# Patient Record
Sex: FEMALE | Race: WHITE | NOT HISPANIC OR LATINO | Employment: OTHER | URBAN - METROPOLITAN AREA
[De-identification: names, ages, dates, MRNs, and addresses within clinical notes are randomized per-mention and may not be internally consistent; named-entity substitution may affect disease eponyms.]

---

## 2017-01-11 ENCOUNTER — APPOINTMENT (OUTPATIENT)
Dept: WOMENS IMAGING | Facility: HOSPITAL | Age: 62
End: 2017-01-11

## 2017-01-11 PROCEDURE — 76641 ULTRASOUND BREAST COMPLETE: CPT | Performed by: RADIOLOGY

## 2017-01-11 PROCEDURE — G0279 TOMOSYNTHESIS, MAMMO: HCPCS | Performed by: RADIOLOGY

## 2017-01-11 PROCEDURE — 77065 DX MAMMO INCL CAD UNI: CPT | Performed by: RADIOLOGY

## 2017-01-11 PROCEDURE — 77061 BREAST TOMOSYNTHESIS UNI: CPT | Performed by: RADIOLOGY

## 2017-01-11 PROCEDURE — G0206 DX MAMMO INCL CAD UNI: HCPCS | Performed by: RADIOLOGY

## 2017-07-12 ENCOUNTER — APPOINTMENT (OUTPATIENT)
Dept: WOMENS IMAGING | Facility: HOSPITAL | Age: 62
End: 2017-07-12

## 2017-07-12 PROCEDURE — 76641 ULTRASOUND BREAST COMPLETE: CPT | Performed by: RADIOLOGY

## 2017-07-12 PROCEDURE — 77061 BREAST TOMOSYNTHESIS UNI: CPT | Performed by: RADIOLOGY

## 2017-07-12 PROCEDURE — 77065 DX MAMMO INCL CAD UNI: CPT | Performed by: RADIOLOGY

## 2017-07-12 PROCEDURE — G0279 TOMOSYNTHESIS, MAMMO: HCPCS | Performed by: RADIOLOGY

## 2017-07-12 PROCEDURE — G0206 DX MAMMO INCL CAD UNI: HCPCS | Performed by: RADIOLOGY

## 2017-07-14 ENCOUNTER — APPOINTMENT (OUTPATIENT)
Dept: WOMENS IMAGING | Facility: HOSPITAL | Age: 62
End: 2017-07-14

## 2017-07-14 PROCEDURE — 19083 BX BREAST 1ST LESION US IMAG: CPT | Performed by: RADIOLOGY

## 2017-09-04 DIAGNOSIS — E03.9 ACQUIRED HYPOTHYROIDISM: ICD-10-CM

## 2017-09-05 RX ORDER — LEVOTHYROXINE SODIUM 0.05 MG/1
TABLET ORAL
Qty: 30 TABLET | Refills: 0 | Status: SHIPPED | OUTPATIENT
Start: 2017-09-05 | End: 2017-10-16 | Stop reason: SDUPTHER

## 2017-10-09 DIAGNOSIS — E78.5 HYPERLIPIDEMIA, UNSPECIFIED HYPERLIPIDEMIA TYPE: ICD-10-CM

## 2017-10-09 DIAGNOSIS — E03.9 HYPOTHYROIDISM, UNSPECIFIED TYPE: Primary | ICD-10-CM

## 2017-10-09 DIAGNOSIS — Z00.00 PREVENTATIVE HEALTH CARE: ICD-10-CM

## 2017-10-09 LAB
ALBUMIN SERPL-MCNC: 4.5 G/DL (ref 3.5–5.2)
ALBUMIN/GLOB SERPL: 1.9 G/DL
ALP SERPL-CCNC: 79 U/L (ref 39–117)
ALT SERPL-CCNC: 11 U/L (ref 1–33)
AST SERPL-CCNC: 12 U/L (ref 1–32)
BILIRUB SERPL-MCNC: 0.4 MG/DL (ref 0.1–1.2)
BUN SERPL-MCNC: 13 MG/DL (ref 8–23)
BUN/CREAT SERPL: 14.1 (ref 7–25)
CALCIUM SERPL-MCNC: 9.8 MG/DL (ref 8.6–10.5)
CHLORIDE SERPL-SCNC: 101 MMOL/L (ref 98–107)
CHOLEST SERPL-MCNC: 222 MG/DL (ref 0–200)
CO2 SERPL-SCNC: 27 MMOL/L (ref 22–29)
CREAT SERPL-MCNC: 0.92 MG/DL (ref 0.57–1)
GLOBULIN SER CALC-MCNC: 2.4 GM/DL
GLUCOSE SERPL-MCNC: 94 MG/DL (ref 65–99)
HDLC SERPL-MCNC: 53 MG/DL (ref 40–60)
LDLC SERPL CALC-MCNC: 140 MG/DL (ref 0–100)
LDLC/HDLC SERPL: 2.64 {RATIO}
POTASSIUM SERPL-SCNC: 4.4 MMOL/L (ref 3.5–5.2)
PROT SERPL-MCNC: 6.9 G/DL (ref 6–8.5)
SODIUM SERPL-SCNC: 141 MMOL/L (ref 136–145)
TRIGL SERPL-MCNC: 145 MG/DL (ref 0–150)
VLDLC SERPL CALC-MCNC: 29 MG/DL (ref 5–40)

## 2017-10-10 LAB — TSH SERPL DL<=0.005 MIU/L-ACNC: 0.28 MIU/ML (ref 0.27–4.2)

## 2017-10-16 ENCOUNTER — OFFICE VISIT (OUTPATIENT)
Dept: FAMILY MEDICINE CLINIC | Facility: CLINIC | Age: 62
End: 2017-10-16

## 2017-10-16 VITALS
HEART RATE: 73 BPM | BODY MASS INDEX: 24.59 KG/M2 | OXYGEN SATURATION: 94 % | SYSTOLIC BLOOD PRESSURE: 118 MMHG | DIASTOLIC BLOOD PRESSURE: 80 MMHG | WEIGHT: 144 LBS | HEIGHT: 64 IN

## 2017-10-16 DIAGNOSIS — Z00.00 HEALTHCARE MAINTENANCE: Primary | ICD-10-CM

## 2017-10-16 DIAGNOSIS — E03.9 ACQUIRED HYPOTHYROIDISM: ICD-10-CM

## 2017-10-16 DIAGNOSIS — Z23 NEED FOR VACCINATION: ICD-10-CM

## 2017-10-16 PROBLEM — C50.911: Status: ACTIVE | Noted: 2017-07-24

## 2017-10-16 PROBLEM — Z17.0: Status: ACTIVE | Noted: 2017-07-24

## 2017-10-16 PROCEDURE — 90715 TDAP VACCINE 7 YRS/> IM: CPT | Performed by: FAMILY MEDICINE

## 2017-10-16 PROCEDURE — 90471 IMMUNIZATION ADMIN: CPT | Performed by: FAMILY MEDICINE

## 2017-10-16 PROCEDURE — 90472 IMMUNIZATION ADMIN EACH ADD: CPT | Performed by: FAMILY MEDICINE

## 2017-10-16 PROCEDURE — 90686 IIV4 VACC NO PRSV 0.5 ML IM: CPT | Performed by: FAMILY MEDICINE

## 2017-10-16 PROCEDURE — 99396 PREV VISIT EST AGE 40-64: CPT | Performed by: FAMILY MEDICINE

## 2017-10-16 RX ORDER — LETROZOLE 2.5 MG/1
TABLET, FILM COATED ORAL
Refills: 3 | COMMUNITY
Start: 2017-09-22 | End: 2018-01-29

## 2017-10-16 RX ORDER — LEVOTHYROXINE SODIUM 0.05 MG/1
50 TABLET ORAL DAILY
Qty: 90 TABLET | Refills: 3 | Status: SHIPPED | OUTPATIENT
Start: 2017-10-16 | End: 2018-09-28 | Stop reason: SDUPTHER

## 2017-10-16 NOTE — PROGRESS NOTES
Ciara Alonzo is a 62 y.o. female.  Seen 10/16/2017    Assessment/Plan   Problem List Items Addressed This Visit     None      Visit Diagnoses     Need for vaccination    -  Primary    Relevant Orders    Flu Vaccine Quad PF 3YR+ (FLUARIX 1355-4001)             No Follow-up on file.  There are no Patient Instructions on file for this visit.    Subjective     Chief Complaint   Patient presents with   • Annual Exam     Social History   Substance Use Topics   • Smoking status: Never Smoker   • Smokeless tobacco: Never Used   • Alcohol use None       History of Present Illness     Annual Exam-Postmenopausal:     Ciara Alonzo 62 y.o.female presents for annual exam.     Last pap: approximate date 2016 and was normal  Last mammogramapproximate date 11/16 and was abnormal: She has undergone lumpectomy and is currently having radiation  Patient is on Femara for estrogen+ breast ca  .  The patient wears seatbelts: yes.    The patient has regular exercise: usually though not lately with her breast CA tx.    This patient has ever been tested for HepC: no .   Dental Exam. up to date  colonoscopy 10 years ago without abnormalities.  Her immunization are not up-to-date. Immunizations are updated today with a TdaP today, Flu Vaccine today.   She is eating a healthy diet.   Labs results were discussed    The following portions of the patient's history were reviewed and updated as appropriate:PMHroutine: Social history , Past Medical History, Surgical history , Allergies, Current Medications, Active Problem List, Family History and Health Maintenance    Review of Systems   Constitutional: Negative for activity change, appetite change, chills, fatigue, fever and unexpected weight change.   HENT: Negative for congestion, ear pain, hearing loss, nosebleeds, rhinorrhea and sore throat.    Eyes: Negative for pain, redness and visual disturbance.   Respiratory: Negative for cough, shortness of breath and wheezing.   "  Cardiovascular: Negative for chest pain, palpitations and leg swelling.   Gastrointestinal: Negative for abdominal pain, blood in stool, constipation, diarrhea, nausea and vomiting.   Endocrine: Negative for cold intolerance and heat intolerance.   Genitourinary: Negative for difficulty urinating, dysuria, frequency, hematuria, pelvic pain, urgency and vaginal discharge.   Musculoskeletal: Negative for arthralgias, back pain and joint swelling.   Skin: Negative for rash and wound.   Neurological: Negative for dizziness, weakness, numbness and headaches.   Hematological: Does not bruise/bleed easily.   Psychiatric/Behavioral: Negative for dysphoric mood, sleep disturbance and suicidal ideas. The patient is not nervous/anxious.        Objective   Vitals:    10/16/17 0941   BP: 118/80   Pulse: 73   SpO2: 94%   Weight: 144 lb (65.3 kg)   Height: 64\" (162.6 cm)     Body mass index is 24.72 kg/(m^2).  Physical Exam   Constitutional: She is oriented to person, place, and time. She appears well-developed and well-nourished. No distress.   HENT:   Head: Normocephalic and atraumatic.   Right Ear: Tympanic membrane, external ear and ear canal normal.   Left Ear: Tympanic membrane, external ear and ear canal normal.   Mouth/Throat: Oropharynx is clear and moist.   Eyes: Conjunctivae are normal.   Neck: Normal range of motion. Neck supple. No tracheal deviation present. No thyromegaly present.   Cardiovascular: Normal rate, regular rhythm, normal heart sounds and intact distal pulses.    Pulmonary/Chest: Effort normal and breath sounds normal. No respiratory distress. She has no wheezes. She has no rales.   Abdominal: Soft. Bowel sounds are normal. She exhibits no distension. There is no hepatosplenomegaly. There is no tenderness.   Musculoskeletal: She exhibits no edema or deformity.   Lymphadenopathy:     She has no cervical adenopathy.   Neurological: She is alert and oriented to person, place, and time.   Skin: Skin is " warm and dry.   Psychiatric: She has a normal mood and affect. Her behavior is normal. Judgment and thought content normal.   Vitals reviewed.    Reviewed Data:  Orders Only on 10/09/2017   Component Value Ref Range   • TSH 0.280  0.270 - 4.200 mIU/mL   • Total Cholesterol 222* 0 - 200 mg/dL   • Triglycerides 145  0 - 150 mg/dL   • HDL Cholesterol 53  40 - 60 mg/dL   • VLDL Cholesterol 29  5 - 40 mg/dL   • LDL Cholesterol  140* 0 - 100 mg/dL   • LDL/HDL Ratio 2.64     • Glucose 94  65 - 99 mg/dL   • BUN 13  8 - 23 mg/dL   • Creatinine 0.92  0.57 - 1.00 mg/dL   • eGFR Non African Am 62  >60 mL/min/1.73   • eGFR African Am 75  >60 mL/min/1.73   • BUN/Creatinine Ratio 14.1  7.0 - 25.0   • Sodium 141  136 - 145 mmol/L   • Potassium 4.4  3.5 - 5.2 mmol/L   • Chloride 101  98 - 107 mmol/L   • Total CO2 27.0  22.0 - 29.0 mmol/L   • Calcium 9.8  8.6 - 10.5 mg/dL   • Total Protein 6.9  6.0 - 8.5 g/dL   • Albumin 4.50  3.50 - 5.20 g/dL   • Globulin 2.4  gm/dL   • A/G Ratio 1.9  g/dL   • Total Bilirubin 0.4  0.1 - 1.2 mg/dL   • Alkaline Phosphatase 79  39 - 117 U/L   • AST (SGOT) 12  1 - 32 U/L   • ALT (SGPT) 11  1 - 33 U/L

## 2017-11-20 DIAGNOSIS — Z12.11 COLON CANCER SCREENING: Primary | ICD-10-CM

## 2017-11-20 RX ORDER — SODIUM CHLORIDE, SODIUM LACTATE, POTASSIUM CHLORIDE, CALCIUM CHLORIDE 600; 310; 30; 20 MG/100ML; MG/100ML; MG/100ML; MG/100ML
30 INJECTION, SOLUTION INTRAVENOUS CONTINUOUS
Status: CANCELLED | OUTPATIENT
Start: 2018-01-30

## 2017-12-11 DIAGNOSIS — E78.2 MIXED HYPERLIPIDEMIA: ICD-10-CM

## 2017-12-11 DIAGNOSIS — F41.8 DEPRESSION WITH ANXIETY: ICD-10-CM

## 2017-12-11 RX ORDER — CITALOPRAM 20 MG/1
20 TABLET ORAL DAILY
Qty: 90 TABLET | Refills: 1 | Status: SHIPPED | OUTPATIENT
Start: 2017-12-11 | End: 2018-09-28 | Stop reason: SDUPTHER

## 2017-12-11 RX ORDER — BUPROPION HYDROCHLORIDE 300 MG/1
300 TABLET ORAL DAILY
Qty: 90 TABLET | Refills: 1 | Status: SHIPPED | OUTPATIENT
Start: 2017-12-11 | End: 2018-06-02 | Stop reason: SDUPTHER

## 2017-12-11 RX ORDER — OMEGA-3-ACID ETHYL ESTERS 1 G/1
1 CAPSULE, LIQUID FILLED ORAL 2 TIMES DAILY
Qty: 180 CAPSULE | Refills: 1 | Status: SHIPPED | OUTPATIENT
Start: 2017-12-11 | End: 2019-10-11 | Stop reason: SDUPTHER

## 2018-01-02 PROBLEM — Z12.11 COLON CANCER SCREENING: Status: ACTIVE | Noted: 2018-01-02

## 2018-01-29 RX ORDER — LETROZOLE 2.5 MG/1
2.5 TABLET, FILM COATED ORAL DAILY
COMMUNITY

## 2018-01-30 ENCOUNTER — ANESTHESIA EVENT (OUTPATIENT)
Dept: GASTROENTEROLOGY | Facility: HOSPITAL | Age: 63
End: 2018-01-30

## 2018-01-30 ENCOUNTER — HOSPITAL ENCOUNTER (OUTPATIENT)
Facility: HOSPITAL | Age: 63
Setting detail: HOSPITAL OUTPATIENT SURGERY
Discharge: HOME | End: 2018-01-30
Attending: INTERNAL MEDICINE | Admitting: INTERNAL MEDICINE

## 2018-01-30 ENCOUNTER — ANESTHESIA (OUTPATIENT)
Dept: GASTROENTEROLOGY | Facility: HOSPITAL | Age: 63
End: 2018-01-30

## 2018-01-30 VITALS
RESPIRATION RATE: 14 BRPM | SYSTOLIC BLOOD PRESSURE: 118 MMHG | OXYGEN SATURATION: 100 % | HEART RATE: 70 BPM | HEIGHT: 63 IN | TEMPERATURE: 97.9 F | BODY MASS INDEX: 24.26 KG/M2 | DIASTOLIC BLOOD PRESSURE: 77 MMHG | WEIGHT: 136.9 LBS

## 2018-01-30 DIAGNOSIS — Z12.11 COLON CANCER SCREENING: ICD-10-CM

## 2018-01-30 PROCEDURE — 45380 COLONOSCOPY AND BIOPSY: CPT | Performed by: INTERNAL MEDICINE

## 2018-01-30 PROCEDURE — 25010000002 PROPOFOL 10 MG/ML EMULSION: Performed by: ANESTHESIOLOGY

## 2018-01-30 PROCEDURE — 88305 TISSUE EXAM BY PATHOLOGIST: CPT | Performed by: INTERNAL MEDICINE

## 2018-01-30 RX ORDER — PROPOFOL 10 MG/ML
VIAL (ML) INTRAVENOUS AS NEEDED
Status: DISCONTINUED | OUTPATIENT
Start: 2018-01-30 | End: 2018-01-30 | Stop reason: SURG

## 2018-01-30 RX ORDER — SODIUM CHLORIDE 0.9 % (FLUSH) 0.9 %
1-10 SYRINGE (ML) INJECTION AS NEEDED
Status: DISCONTINUED | OUTPATIENT
Start: 2018-01-30 | End: 2018-01-30 | Stop reason: HOSPADM

## 2018-01-30 RX ORDER — PROPOFOL 10 MG/ML
VIAL (ML) INTRAVENOUS CONTINUOUS PRN
Status: DISCONTINUED | OUTPATIENT
Start: 2018-01-30 | End: 2018-01-30 | Stop reason: SURG

## 2018-01-30 RX ORDER — SODIUM CHLORIDE, SODIUM LACTATE, POTASSIUM CHLORIDE, CALCIUM CHLORIDE 600; 310; 30; 20 MG/100ML; MG/100ML; MG/100ML; MG/100ML
30 INJECTION, SOLUTION INTRAVENOUS CONTINUOUS
Status: DISCONTINUED | OUTPATIENT
Start: 2018-01-30 | End: 2018-01-30 | Stop reason: HOSPADM

## 2018-01-30 RX ORDER — LIDOCAINE HYDROCHLORIDE 20 MG/ML
INJECTION, SOLUTION INFILTRATION; PERINEURAL AS NEEDED
Status: DISCONTINUED | OUTPATIENT
Start: 2018-01-30 | End: 2018-01-30 | Stop reason: SURG

## 2018-01-30 RX ADMIN — LIDOCAINE HYDROCHLORIDE 50 MG: 20 INJECTION, SOLUTION INFILTRATION; PERINEURAL at 11:19

## 2018-01-30 RX ADMIN — PROPOFOL 50 MG: 10 INJECTION, EMULSION INTRAVENOUS at 11:25

## 2018-01-30 RX ADMIN — PROPOFOL 75 MG: 10 INJECTION, EMULSION INTRAVENOUS at 11:22

## 2018-01-30 RX ADMIN — PROPOFOL 125 MCG/KG/MIN: 10 INJECTION, EMULSION INTRAVENOUS at 11:22

## 2018-01-30 RX ADMIN — SODIUM CHLORIDE, POTASSIUM CHLORIDE, SODIUM LACTATE AND CALCIUM CHLORIDE 30 ML/HR: 600; 310; 30; 20 INJECTION, SOLUTION INTRAVENOUS at 10:21

## 2018-01-30 NOTE — ANESTHESIA PREPROCEDURE EVALUATION
Anesthesia Evaluation     Patient summary reviewed     NPO Liquid Status: > 8 hours     Airway   no difficulty expected  Dental      Pulmonary    Cardiovascular     Rhythm: regular    (+) hyperlipidemia      Neuro/Psych  GI/Hepatic/Renal/Endo    (+)  hypothyroidism,     Musculoskeletal     Abdominal    Substance History      OB/GYN          Other                                                Anesthesia Plan    ASA 2     MAC     Anesthetic plan and risks discussed with patient.

## 2018-01-30 NOTE — ANESTHESIA POSTPROCEDURE EVALUATION
"Patient: Ciara Alonzo    Procedure Summary     Date Anesthesia Start Anesthesia Stop Room / Location    01/30/18 1117 1155  [facility] /  [facility]       Procedure Diagnosis Surgeon Provider    COLONOSCOPY TO CECUM AND INTO TERMINAL ILEUM WITH COLD BIOPSY POLYPECTOMY (N/A ) Colon cancer screening  (Colon cancer screening [Z12.11]) MD Erasmo Becker MD          Anesthesia Type: MAC  Last vitals  BP   118/77 (01/30/18 1215)   Temp   36.6 °C (97.9 °F) (01/30/18 1155)   Pulse   70 (01/30/18 1215)   Resp   14 (01/30/18 1215)     SpO2   100 % (01/30/18 1215)     Post Anesthesia Care and Evaluation    Patient location during evaluation: PACU  Patient participation: complete - patient participated  Level of consciousness: awake  Pain score: 0  Pain management: adequate  Airway patency: patent  Anesthetic complications: No anesthetic complications  PONV Status: none  Cardiovascular status: acceptable  Respiratory status: acceptable  Hydration status: acceptable    Comments: /77 (BP Location: Left arm, Patient Position: Sitting)  Pulse 70  Temp 36.6 °C (97.9 °F) (Oral)   Resp 14  Ht 160 cm (63\")  Wt 62.1 kg (136 lb 14.4 oz)  SpO2 100%  BMI 24.25 kg/m2      "

## 2018-01-31 LAB
CYTO UR: NORMAL
LAB AP CASE REPORT: NORMAL
Lab: NORMAL
PATH REPORT.FINAL DX SPEC: NORMAL
PATH REPORT.GROSS SPEC: NORMAL

## 2018-02-03 NOTE — PROGRESS NOTES
The polyp removed was a benign hyperplastic polyp.      Pls place in 3 year recall with 2 day bowel prep (due to bowel prep).

## 2018-02-07 ENCOUNTER — TELEPHONE (OUTPATIENT)
Dept: GASTROENTEROLOGY | Facility: CLINIC | Age: 63
End: 2018-02-07

## 2018-02-07 NOTE — TELEPHONE ENCOUNTER
----- Message from Mee Hou MD sent at 2/3/2018  3:43 PM EST -----  The polyp removed was a benign hyperplastic polyp.      Pls place in 3 year recall with 2 day bowel prep (due to bowel prep).

## 2018-02-07 NOTE — TELEPHONE ENCOUNTER
Called pt and advised per Dr Hou that the polyp removed was a benign hyperplastic polyp.  She recommends a repeat c/s in 3 yrs with a 2 day bowel prep.  Pt verb understanding.     C/s placed in recall for 01/31/2021.

## 2018-02-08 ENCOUNTER — OFFICE VISIT (OUTPATIENT)
Dept: FAMILY MEDICINE CLINIC | Facility: CLINIC | Age: 63
End: 2018-02-08

## 2018-02-08 VITALS
BODY MASS INDEX: 24.45 KG/M2 | HEIGHT: 63 IN | OXYGEN SATURATION: 97 % | DIASTOLIC BLOOD PRESSURE: 80 MMHG | WEIGHT: 138 LBS | HEART RATE: 75 BPM | SYSTOLIC BLOOD PRESSURE: 116 MMHG

## 2018-02-08 DIAGNOSIS — M23.307 DEGENERATIVE TEAR OF MENISCUS OF LEFT KNEE: ICD-10-CM

## 2018-02-08 DIAGNOSIS — M25.562 ACUTE PAIN OF LEFT KNEE: Primary | ICD-10-CM

## 2018-02-08 PROCEDURE — 99213 OFFICE O/P EST LOW 20 MIN: CPT | Performed by: NURSE PRACTITIONER

## 2018-02-08 NOTE — PROGRESS NOTES
Ciara Alonzo is a 62 y.o. female. Left knee pain - felt pop after crossing knee and has locking and catching.   Hx of cervical and breast cancer in remission  Pt has seen dr st for stress fracture - I suspect OA and a degenerative meniscus tear - will try a plain film and physical therapy as pt wants to avoid surgical interventions. Advised to take aleve otc. CMP from 10-17 WNL    Assessment/Plan   Problem List Items Addressed This Visit     None      Visit Diagnoses     Acute pain of left knee    -  Primary    Relevant Orders    XR Knee 3 View Left    Ambulatory Referral to Physical Therapy Evaluate and treat    Degenerative tear of meniscus of left knee        Relevant Orders    Ambulatory Referral to Physical Therapy Evaluate and treat             No Follow-up on file.  Patient Instructions   Knee Effusion  Introduction  Knee effusion means that you have extra fluid in your knee. This can cause pain. Your knee may be more difficult to bend and move.  Follow these instructions at home:  · Use crutches as told by your doctor.  · Wear a knee brace as told by your doctor.  · Apply ice to the swollen area:  ¨ Put ice in a plastic bag.  ¨ Place a towel between your skin and the bag.  ¨ Leave the ice on for 20 minutes, 2-3 times per day.  · Keep your knee raised (elevated) when you are sitting or lying down.  · Take medicines only as told by your doctor.  · Do any rehabilitation or strengthening exercises as told by your doctor.  · Rest your knee as told by your doctor. You may start doing your normal activities again when your doctor says it is okay.  · Keep all follow-up visits as told by your doctor. This is important.  Contact a doctor if:  · You continue to have pain in your knee.  Get help right away if:  · You have increased swelling or redness of your knee.  · You have severe pain in your knee.  · You have a fever.  This information is not intended to replace advice given to you by your health care  "provider. Make sure you discuss any questions you have with your health care provider.  Document Released: 01/20/2012 Document Revised: 05/25/2017 Document Reviewed: 08/03/2015  © 2017 [employer]        Chief Complaint   Patient presents with   • Knee Pain     L x 1 week     Social History   Substance Use Topics   • Smoking status: Never Smoker   • Smokeless tobacco: Never Used   • Alcohol use Yes      Comment: SOCIAL       History of Present Illness     The following portions of the patient's history were reviewed and updated as appropriate:PMHroutine: Social history , Allergies, Current Medications, Active Problem List and Health Maintenance    Review of Systems   Constitutional: Negative for activity change, appetite change, chills, fatigue, fever and unexpected weight change.   HENT: Negative for congestion, ear pain, hearing loss, nosebleeds, rhinorrhea and sore throat.    Eyes: Negative for pain, redness and visual disturbance.   Respiratory: Negative for cough, shortness of breath and wheezing.    Cardiovascular: Negative for chest pain, palpitations and leg swelling.   Gastrointestinal: Negative for abdominal pain, blood in stool, constipation, diarrhea, nausea and vomiting.   Endocrine: Negative for cold intolerance and heat intolerance.   Genitourinary: Negative for difficulty urinating, dysuria, frequency, hematuria, pelvic pain, urgency and vaginal discharge.   Musculoskeletal: Negative for arthralgias, back pain and joint swelling.   Skin: Negative for rash and wound.   Neurological: Negative for dizziness, weakness, numbness and headaches.   Hematological: Does not bruise/bleed easily.   Psychiatric/Behavioral: Negative for dysphoric mood, sleep disturbance and suicidal ideas. The patient is not nervous/anxious.        Objective   Vitals:    02/08/18 1106   BP: 116/80   Pulse: 75   SpO2: 97%   Weight: 62.6 kg (138 lb)   Height: 160 cm (62.99\")     Body mass index is 24.45 kg/(m^2).  Physical Exam "   Constitutional: She is oriented to person, place, and time. She appears well-developed.   HENT:   Head: Normocephalic.   Pulmonary/Chest: Effort normal.   Musculoskeletal:        Left knee: She exhibits decreased range of motion, effusion and abnormal meniscus. She exhibits normal patellar mobility, no bony tenderness and no MCL laxity. Tenderness found. Medial joint line tenderness noted. No MCL, no LCL and no patellar tendon tenderness noted.   Neurological: She is alert and oriented to person, place, and time.   Skin: Skin is warm and dry.   Psychiatric: She has a normal mood and affect. Her behavior is normal. Thought content normal.     Reviewed Data:  Admission on 01/30/2018, Discharged on 01/30/2018   Component Date Value Ref Range Status   • Case Report 01/30/2018    Final                    Value:Surgical Pathology Report                         Case: [id number]                                  Authorizing Provider:  Mee Hou MD         Collected:           01/30/2018 11:49 AM          Ordering Location:     [facility]  Received:            01/30/2018 01:16 PM                                 ENDO [facility]IT[facility]                                                                  Pathologist:           Aidan Ohara MD                                                       Specimen:    Large Intestine, Rectum, RECTAL POLYP                                                     • Final Diagnosis 01/30/2018    Final                    Value:This result contains rich text formatting which cannot be displayed here.   • Gross Description 01/30/2018    Final                    Value:This result contains rich text formatting which cannot be displayed here.   • Microscopic Description 01/30/2018    Final                    Value:This result contains rich text formatting which cannot be displayed here.

## 2018-02-09 ENCOUNTER — HOSPITAL ENCOUNTER (OUTPATIENT)
Dept: GENERAL RADIOLOGY | Facility: HOSPITAL | Age: 63
Discharge: HOME | End: 2018-02-09
Admitting: NURSE PRACTITIONER

## 2018-02-09 DIAGNOSIS — M25.562 ACUTE PAIN OF LEFT KNEE: ICD-10-CM

## 2018-02-09 PROCEDURE — 73562 X-RAY EXAM OF KNEE 3: CPT

## 2018-03-23 ENCOUNTER — TELEPHONE (OUTPATIENT)
Dept: FAMILY MEDICINE CLINIC | Facility: CLINIC | Age: 63
End: 2018-03-23

## 2018-03-23 NOTE — TELEPHONE ENCOUNTER
Patient contacted the office with patches of red dry skin on her face. Several weeks. Appointment has been made.

## 2018-06-02 DIAGNOSIS — F41.8 DEPRESSION WITH ANXIETY: ICD-10-CM

## 2018-06-04 RX ORDER — BUPROPION HYDROCHLORIDE 300 MG/1
300 TABLET ORAL DAILY
Qty: 90 TABLET | Refills: 1 | Status: SHIPPED | OUTPATIENT
Start: 2018-06-04 | End: 2018-07-09 | Stop reason: SDUPTHER

## 2018-07-09 DIAGNOSIS — F41.8 DEPRESSION WITH ANXIETY: ICD-10-CM

## 2018-07-09 RX ORDER — BUPROPION HYDROCHLORIDE 300 MG/1
300 TABLET ORAL DAILY
Qty: 90 TABLET | Refills: 0 | Status: SHIPPED | OUTPATIENT
Start: 2018-07-09 | End: 2018-09-28 | Stop reason: SDUPTHER

## 2018-07-27 RX ORDER — SCOLOPAMINE TRANSDERMAL SYSTEM 1 MG/1
1 PATCH, EXTENDED RELEASE TRANSDERMAL
Qty: 3 PATCH | Refills: 0 | Status: SHIPPED | OUTPATIENT
Start: 2018-07-27 | End: 2018-09-28

## 2018-09-19 DIAGNOSIS — Z01.89 ROUTINE LAB DRAW: ICD-10-CM

## 2018-09-19 DIAGNOSIS — E78.2 MIXED HYPERLIPIDEMIA: Primary | ICD-10-CM

## 2018-09-19 DIAGNOSIS — E03.9 ACQUIRED HYPOTHYROIDISM: ICD-10-CM

## 2018-09-24 ENCOUNTER — RESULTS ENCOUNTER (OUTPATIENT)
Dept: FAMILY MEDICINE CLINIC | Facility: CLINIC | Age: 63
End: 2018-09-24

## 2018-09-24 DIAGNOSIS — E78.2 MIXED HYPERLIPIDEMIA: ICD-10-CM

## 2018-09-24 DIAGNOSIS — Z01.89 ROUTINE LAB DRAW: ICD-10-CM

## 2018-09-24 DIAGNOSIS — E03.9 ACQUIRED HYPOTHYROIDISM: ICD-10-CM

## 2018-09-24 LAB
ALBUMIN SERPL-MCNC: 4.3 G/DL (ref 3.5–5.2)
ALBUMIN/GLOB SERPL: 1.7 G/DL
ALP SERPL-CCNC: 72 U/L (ref 39–117)
ALT SERPL-CCNC: 13 U/L (ref 1–33)
AST SERPL-CCNC: 11 U/L (ref 1–32)
BASOPHILS # BLD AUTO: 0.03 10*3/MM3 (ref 0–0.2)
BASOPHILS NFR BLD AUTO: 0.5 % (ref 0–1.5)
BILIRUB SERPL-MCNC: 0.2 MG/DL (ref 0.1–1.2)
BUN SERPL-MCNC: 12 MG/DL (ref 8–23)
BUN/CREAT SERPL: 13.2 (ref 7–25)
CALCIUM SERPL-MCNC: 9.4 MG/DL (ref 8.6–10.5)
CHLORIDE SERPL-SCNC: 104 MMOL/L (ref 98–107)
CHOLEST SERPL-MCNC: 231 MG/DL (ref 0–200)
CO2 SERPL-SCNC: 27.4 MMOL/L (ref 22–29)
CREAT SERPL-MCNC: 0.91 MG/DL (ref 0.57–1)
EOSINOPHIL # BLD AUTO: 0.2 10*3/MM3 (ref 0–0.7)
EOSINOPHIL NFR BLD AUTO: 3.5 % (ref 0.3–6.2)
ERYTHROCYTE [DISTWIDTH] IN BLOOD BY AUTOMATED COUNT: 13.9 % (ref 11.7–13)
GLOBULIN SER CALC-MCNC: 2.5 GM/DL
GLUCOSE SERPL-MCNC: 91 MG/DL (ref 65–99)
HCT VFR BLD AUTO: 41.6 % (ref 35.6–45.5)
HDLC SERPL-MCNC: 59 MG/DL (ref 40–60)
HGB BLD-MCNC: 13 G/DL (ref 11.9–15.5)
IMM GRANULOCYTES # BLD: 0 10*3/MM3 (ref 0–0.03)
IMM GRANULOCYTES NFR BLD: 0 % (ref 0–0.5)
LDLC SERPL CALC-MCNC: 144 MG/DL (ref 0–100)
LDLC/HDLC SERPL: 2.44 {RATIO}
LYMPHOCYTES # BLD AUTO: 1.1 10*3/MM3 (ref 0.9–4.8)
LYMPHOCYTES NFR BLD AUTO: 19.4 % (ref 19.6–45.3)
MCH RBC QN AUTO: 29.7 PG (ref 26.9–32)
MCHC RBC AUTO-ENTMCNC: 31.3 G/DL (ref 32.4–36.3)
MCV RBC AUTO: 95 FL (ref 80.5–98.2)
MONOCYTES # BLD AUTO: 0.33 10*3/MM3 (ref 0.2–1.2)
MONOCYTES NFR BLD AUTO: 5.8 % (ref 5–12)
NEUTROPHILS # BLD AUTO: 4 10*3/MM3 (ref 1.9–8.1)
NEUTROPHILS NFR BLD AUTO: 70.8 % (ref 42.7–76)
PLATELET # BLD AUTO: 293 10*3/MM3 (ref 140–500)
POTASSIUM SERPL-SCNC: 4.2 MMOL/L (ref 3.5–5.2)
PROT SERPL-MCNC: 6.8 G/DL (ref 6–8.5)
RBC # BLD AUTO: 4.38 10*6/MM3 (ref 3.9–5.2)
SODIUM SERPL-SCNC: 143 MMOL/L (ref 136–145)
TRIGL SERPL-MCNC: 140 MG/DL (ref 0–150)
TSH SERPL DL<=0.005 MIU/L-ACNC: 0.74 MIU/ML (ref 0.27–4.2)
VLDLC SERPL CALC-MCNC: 28 MG/DL (ref 5–40)
WBC # BLD AUTO: 5.66 10*3/MM3 (ref 4.5–10.7)

## 2018-09-28 ENCOUNTER — OFFICE VISIT (OUTPATIENT)
Dept: FAMILY MEDICINE CLINIC | Facility: CLINIC | Age: 63
End: 2018-09-28

## 2018-09-28 VITALS
HEART RATE: 73 BPM | HEIGHT: 63 IN | SYSTOLIC BLOOD PRESSURE: 110 MMHG | BODY MASS INDEX: 24.63 KG/M2 | RESPIRATION RATE: 16 BRPM | WEIGHT: 139 LBS | OXYGEN SATURATION: 100 % | DIASTOLIC BLOOD PRESSURE: 82 MMHG

## 2018-09-28 DIAGNOSIS — F41.8 DEPRESSION WITH ANXIETY: ICD-10-CM

## 2018-09-28 DIAGNOSIS — E78.2 MIXED HYPERLIPIDEMIA: Primary | ICD-10-CM

## 2018-09-28 DIAGNOSIS — Z23 NEED FOR VACCINATION: ICD-10-CM

## 2018-09-28 DIAGNOSIS — E03.9 ACQUIRED HYPOTHYROIDISM: ICD-10-CM

## 2018-09-28 PROCEDURE — 90471 IMMUNIZATION ADMIN: CPT | Performed by: FAMILY MEDICINE

## 2018-09-28 PROCEDURE — 99213 OFFICE O/P EST LOW 20 MIN: CPT | Performed by: FAMILY MEDICINE

## 2018-09-28 PROCEDURE — 90674 CCIIV4 VAC NO PRSV 0.5 ML IM: CPT | Performed by: FAMILY MEDICINE

## 2018-09-28 RX ORDER — LEVOTHYROXINE SODIUM 0.05 MG/1
50 TABLET ORAL DAILY
Qty: 90 TABLET | Refills: 3 | Status: SHIPPED | OUTPATIENT
Start: 2018-09-28 | End: 2018-10-03 | Stop reason: SDUPTHER

## 2018-09-28 RX ORDER — CITALOPRAM 20 MG/1
10 TABLET ORAL DAILY
Qty: 90 TABLET | Refills: 3 | Status: SHIPPED | OUTPATIENT
Start: 2018-09-28 | End: 2019-09-20

## 2018-09-28 RX ORDER — BUPROPION HYDROCHLORIDE 300 MG/1
300 TABLET ORAL DAILY
Qty: 90 TABLET | Refills: 3 | Status: SHIPPED | OUTPATIENT
Start: 2018-09-28 | End: 2019-09-20

## 2018-09-28 NOTE — PROGRESS NOTES
Problem List Items Addressed This Visit        Cardiovascular and Mediastinum    Hyperlipidemia - Primary       Endocrine    Hypothyroidism    Relevant Medications    levothyroxine (SYNTHROID, LEVOTHROID) 50 MCG tablet       Other    Depression with anxiety    Relevant Medications    citalopram (CeleXA) 20 MG tablet    buPROPion XL (WELLBUTRIN XL) 300 MG 24 hr tablet      Other Visit Diagnoses     Need for vaccination        Relevant Orders    Flucelvax Quad=>4Years (2576-5688) (Completed)         Return in about 1 year (around 9/28/2019).  Patient Instructions   Walk more!    Ciara Alonzo is a 63 y.o. female being seen in our office today for Hyperlipidemia and Hypothyroidism                 She  reports that she has never smoked. She has never used smokeless tobacco. She reports that she drinks alcohol. She reports that she does not use drugs.             HPI She's had a rough year but starting to feel some better. She has not gained any weight but her clothes feel tighter. Not moving as much as she used to. Labs are discussed today and OK.              The following portions of the patient's history were reviewed and updated as appropriate:PMHroutine: Social history , Allergies, Current Medications, Active Problem List and Health Maintenance            Review of Systems   Constitutional: Negative for activity change, appetite change, chills, fatigue, fever and unexpected weight change.   HENT: Negative for congestion, ear pain, hearing loss, nosebleeds, rhinorrhea and sore throat.    Eyes: Negative for pain, redness and visual disturbance.   Respiratory: Negative for cough, shortness of breath and wheezing.    Cardiovascular: Negative for chest pain, palpitations and leg swelling.   Gastrointestinal: Negative for abdominal pain, blood in stool, constipation, diarrhea, nausea and vomiting.   Endocrine: Negative for cold intolerance and heat intolerance.   Genitourinary: Negative for difficulty urinating,  dysuria, frequency, hematuria, pelvic pain, urgency and vaginal discharge.   Musculoskeletal: Negative for arthralgias, back pain and joint swelling.   Skin: Negative for rash and wound.   Neurological: Negative for dizziness, weakness, numbness and headaches.   Hematological: Does not bruise/bleed easily.   Psychiatric/Behavioral: Negative for dysphoric mood, sleep disturbance and suicidal ideas. The patient is not nervous/anxious.             BP Readings from Last 1 Encounters:   09/28/18 110/82     Wt Readings from Last 3 Encounters:   09/28/18 63 kg (139 lb)   02/08/18 62.6 kg (138 lb)   01/30/18 62.1 kg (136 lb 14.4 oz)   Body mass index is 24.62 kg/m².             Physical Exam   Constitutional: She is oriented to person, place, and time. Vital signs are normal. She appears well-developed and well-nourished. No distress.   HENT:   Head: Normocephalic.   Cardiovascular: Normal rate, regular rhythm and normal heart sounds.    Pulmonary/Chest: Effort normal.   Neurological: She is alert and oriented to person, place, and time. Gait normal.   Psychiatric: She has a normal mood and affect. Her behavior is normal. Judgment and thought content normal.   Vitals reviewed.        Results Encounter on 09/24/2018   Component Date Value Ref Range Status   • TSH 09/24/2018 0.738  0.270 - 4.200 mIU/mL Final   • WBC 09/24/2018 5.66  4.50 - 10.70 10*3/mm3 Final   • RBC 09/24/2018 4.38  3.90 - 5.20 10*6/mm3 Final   • Hemoglobin 09/24/2018 13.0  11.9 - 15.5 g/dL Final   • Hematocrit 09/24/2018 41.6  35.6 - 45.5 % Final   • MCV 09/24/2018 95.0  80.5 - 98.2 fL Final   • MCH 09/24/2018 29.7  26.9 - 32.0 pg Final   • MCHC 09/24/2018 31.3* 32.4 - 36.3 g/dL Final   • RDW 09/24/2018 13.9* 11.7 - 13.0 % Final   • Platelets 09/24/2018 293  140 - 500 10*3/mm3 Final   • Neutrophil Rel % 09/24/2018 70.8  42.7 - 76.0 % Final   • Lymphocyte Rel % 09/24/2018 19.4* 19.6 - 45.3 % Final   • Monocyte Rel % 09/24/2018 5.8  5.0 - 12.0 % Final   •  Eosinophil Rel % 09/24/2018 3.5  0.3 - 6.2 % Final   • Basophil Rel % 09/24/2018 0.5  0.0 - 1.5 % Final   • Neutrophils Absolute 09/24/2018 4.00  1.90 - 8.10 10*3/mm3 Final   • Lymphocytes Absolute 09/24/2018 1.10  0.90 - 4.80 10*3/mm3 Final   • Monocytes Absolute 09/24/2018 0.33  0.20 - 1.20 10*3/mm3 Final   • Eosinophils Absolute 09/24/2018 0.20  0.00 - 0.70 10*3/mm3 Final   • Basophils Absolute 09/24/2018 0.03  0.00 - 0.20 10*3/mm3 Final   • Immature Granulocyte Rel % 09/24/2018 0.0  0.0 - 0.5 % Final   • Immature Grans Absolute 09/24/2018 0.00  0.00 - 0.03 10*3/mm3 Final   • Total Cholesterol 09/24/2018 231* 0 - 200 mg/dL Final   • Triglycerides 09/24/2018 140  0 - 150 mg/dL Final   • HDL Cholesterol 09/24/2018 59  40 - 60 mg/dL Final   • VLDL Cholesterol 09/24/2018 28  5 - 40 mg/dL Final   • LDL Cholesterol  09/24/2018 144* 0 - 100 mg/dL Final   • LDL/HDL Ratio 09/24/2018 2.44   Final   • Glucose 09/24/2018 91  65 - 99 mg/dL Final   • BUN 09/24/2018 12  8 - 23 mg/dL Final   • Creatinine 09/24/2018 0.91  0.57 - 1.00 mg/dL Final   • eGFR Non  Am 09/24/2018 63  >60 mL/min/1.73 Final   • eGFR African Am 09/24/2018 76  >60 mL/min/1.73 Final   • BUN/Creatinine Ratio 09/24/2018 13.2  7.0 - 25.0 Final   • Sodium 09/24/2018 143  136 - 145 mmol/L Final   • Potassium 09/24/2018 4.2  3.5 - 5.2 mmol/L Final   • Chloride 09/24/2018 104  98 - 107 mmol/L Final   • Total CO2 09/24/2018 27.4  22.0 - 29.0 mmol/L Final   • Calcium 09/24/2018 9.4  8.6 - 10.5 mg/dL Final   • Total Protein 09/24/2018 6.8  6.0 - 8.5 g/dL Final   • Albumin 09/24/2018 4.30  3.50 - 5.20 g/dL Final   • Globulin 09/24/2018 2.5  gm/dL Final   • A/G Ratio 09/24/2018 1.7  g/dL Final   • Total Bilirubin 09/24/2018 0.2  0.1 - 1.2 mg/dL Final   • Alkaline Phosphatase 09/24/2018 72  39 - 117 U/L Final   • AST (SGOT) 09/24/2018 11  1 - 32 U/L Final   • ALT (SGPT) 09/24/2018 13  1 - 33 U/L Final

## 2018-10-03 DIAGNOSIS — E03.9 ACQUIRED HYPOTHYROIDISM: ICD-10-CM

## 2018-10-03 RX ORDER — LEVOTHYROXINE SODIUM 0.05 MG/1
50 TABLET ORAL DAILY
Qty: 90 TABLET | Refills: 1 | Status: SHIPPED | OUTPATIENT
Start: 2018-10-03 | End: 2019-10-11 | Stop reason: SDUPTHER

## 2019-03-18 ENCOUNTER — OFFICE VISIT (OUTPATIENT)
Dept: FAMILY MEDICINE CLINIC | Facility: CLINIC | Age: 64
End: 2019-03-18

## 2019-03-18 VITALS
SYSTOLIC BLOOD PRESSURE: 122 MMHG | BODY MASS INDEX: 24.45 KG/M2 | DIASTOLIC BLOOD PRESSURE: 84 MMHG | OXYGEN SATURATION: 99 % | HEART RATE: 84 BPM | HEIGHT: 63 IN | WEIGHT: 138 LBS | RESPIRATION RATE: 16 BRPM

## 2019-03-18 DIAGNOSIS — S41.112A LACERATION OF LEFT UPPER EXTREMITY, INITIAL ENCOUNTER: Primary | ICD-10-CM

## 2019-03-18 DIAGNOSIS — Z48.02 VISIT FOR SUTURE REMOVAL: ICD-10-CM

## 2019-03-18 PROCEDURE — 99212 OFFICE O/P EST SF 10 MIN: CPT | Performed by: FAMILY MEDICINE

## 2019-03-18 NOTE — PROGRESS NOTES
Problem List Items Addressed This Visit     None      Visit Diagnoses     Laceration of left upper extremity, initial encounter    -  Primary    Visit for suture removal             No Follow-up on file.  There are no Patient Instructions on file for this visit.  Ciara Alonzo is a 63 y.o. female being seen in our office today for Suture / Staple Removal                 She  reports that  has never smoked. she has never used smokeless tobacco. She reports that she drinks alcohol. She reports that she does not use drugs.             HPI Patient broke a glass piece of artwork while cleaning it and sustained a large laceration of her left arm. Had sutures. TDAP was UTD.              The following portions of the patient's history were reviewed and updated as appropriate:PMHroutine: Social history , Past Medical History and Allergies            Review of Systems   Constitutional: Negative for activity change, appetite change, chills, fatigue, fever and unexpected weight change.   HENT: Negative for congestion, ear pain, hearing loss, nosebleeds, rhinorrhea and sore throat.    Eyes: Negative for pain, redness and visual disturbance.   Respiratory: Negative for cough, shortness of breath and wheezing.    Cardiovascular: Negative for chest pain, palpitations and leg swelling.   Gastrointestinal: Negative for abdominal pain, blood in stool, constipation, diarrhea, nausea and vomiting.   Endocrine: Negative for cold intolerance and heat intolerance.   Genitourinary: Negative for difficulty urinating, dysuria, frequency, hematuria, pelvic pain, urgency and vaginal discharge.   Musculoskeletal: Negative for arthralgias, back pain and joint swelling.   Skin: Negative for rash and wound.   Neurological: Negative for dizziness, weakness, numbness and headaches.   Hematological: Does not bruise/bleed easily.   Psychiatric/Behavioral: Negative for dysphoric mood, sleep disturbance and suicidal ideas. The patient is not  nervous/anxious.                 BP Readings from Last 1 Encounters:   03/18/19 122/84     Wt Readings from Last 3 Encounters:   03/18/19 62.6 kg (138 lb)   09/28/18 63 kg (139 lb)   02/08/18 62.6 kg (138 lb)   Body mass index is 24.45 kg/m².             Physical Exam   Constitutional: She appears well-developed and well-nourished.   Skin: Skin is warm and dry.   Well healed large laceration on posterior service of left arm. Sutures removed and steristrips applied.    Psychiatric: She has a normal mood and affect. Her behavior is normal. Judgment and thought content normal.   Vitals reviewed.

## 2019-09-20 ENCOUNTER — OFFICE VISIT (OUTPATIENT)
Dept: FAMILY MEDICINE CLINIC | Facility: CLINIC | Age: 64
End: 2019-09-20

## 2019-09-20 VITALS
DIASTOLIC BLOOD PRESSURE: 78 MMHG | HEIGHT: 63 IN | OXYGEN SATURATION: 98 % | RESPIRATION RATE: 14 BRPM | WEIGHT: 131 LBS | BODY MASS INDEX: 23.21 KG/M2 | SYSTOLIC BLOOD PRESSURE: 128 MMHG | HEART RATE: 80 BPM

## 2019-09-20 DIAGNOSIS — E03.9 ACQUIRED HYPOTHYROIDISM: ICD-10-CM

## 2019-09-20 DIAGNOSIS — Z00.00 PREVENTATIVE HEALTH CARE: Primary | ICD-10-CM

## 2019-09-20 DIAGNOSIS — E78.2 MIXED HYPERLIPIDEMIA: ICD-10-CM

## 2019-09-20 DIAGNOSIS — F41.8 DEPRESSION WITH ANXIETY: ICD-10-CM

## 2019-09-20 PROCEDURE — 90686 IIV4 VACC NO PRSV 0.5 ML IM: CPT | Performed by: FAMILY MEDICINE

## 2019-09-20 PROCEDURE — 90471 IMMUNIZATION ADMIN: CPT | Performed by: FAMILY MEDICINE

## 2019-09-20 PROCEDURE — 99214 OFFICE O/P EST MOD 30 MIN: CPT | Performed by: FAMILY MEDICINE

## 2019-09-20 RX ORDER — BUPROPION HYDROCHLORIDE 300 MG/1
300 TABLET ORAL DAILY
Qty: 90 TABLET | Refills: 3 | Status: SHIPPED | OUTPATIENT
Start: 2019-09-20 | End: 2019-10-11 | Stop reason: SDUPTHER

## 2019-09-20 RX ORDER — CITALOPRAM 20 MG/1
10 TABLET ORAL DAILY
Qty: 90 TABLET | Refills: 3 | Status: SHIPPED | OUTPATIENT
Start: 2019-09-20 | End: 2019-10-11 | Stop reason: SDUPTHER

## 2019-09-20 NOTE — PROGRESS NOTES
ASSESSMENT AND PLAN  Problem List Items Addressed This Visit        Cardiovascular and Mediastinum    Hyperlipidemia    Current Assessment & Plan     [facility]  She will return to office for labs next week.          Relevant Orders    Comprehensive metabolic panel    Lipid Panel With LDL/HDL Ratio   .         Endocrine    Hypothyroidism    Current Assessment & Plan     [facility] 9/20/2019  Will return to office for labs next week.          Relevant Orders    TSH       Other    Depression with anxiety    Current Assessment & Plan     [facility]  This is worse with our national state. We discussed some strategies to cope. She can increases her citalopram to 20 mg if she needs to.            Other Visit Diagnoses     Preventative health care    -  Primary    Relevant Orders    Comprehensive metabolic panel    Lipid Panel With LDL/HDL Ratio    CBC and Differential          Return in about 1 year (around 9/20/2020).    The 10-year ASCVD risk score (Patrice ARRIAGA Jr., et al., 2013) is: 5%    Values used to calculate the score:      Age: 64 years      Sex: Female      Is Non- : No      Diabetic: No      Tobacco smoker: No      Systolic Blood Pressure: 128 mmHg      Is BP treated: No      HDL Cholesterol: 56 mg/dL      Total Cholesterol: 240 mg/dL      SUBJECTIVE  Ciara Alonzo is a 64 y.o. female being seen in our office today for Hyperlipidemia and Depression               Social History  She  reports that she has never smoked. She has never used smokeless tobacco. She reports that she drinks alcohol. She reports that she does not use drugs.    History of the Present Illness   HPI patient presents today for follow-up on medications and needs labs but was told that she would have to get a appointment later in the year to get those (?).  At any rate she is willing to return to the office for lab work next week.  She is taking her hyperlipidemia meds as ordered.  She has had some worsening  of her anxiety and depression that is directly related to the state of the nation.  She does not watch the news.  She tries to avoid upsetting social media.  We did discuss the ability to increase her Celexa which she may do on a as needed basis.  Significant Past History  The following portions of the patient's history were reviewed and updated as appropriate:PMHroutine: Social history , Allergies, Current Medications, Active Problem List and Health Maintenance    Review of Systems   Constitutional: Negative for activity change, appetite change, chills, fatigue, fever and unexpected weight change.   HENT: Negative for congestion, ear pain, hearing loss, nosebleeds, rhinorrhea and sore throat.    Eyes: Negative for pain, redness and visual disturbance.   Respiratory: Negative for cough, shortness of breath and wheezing.    Cardiovascular: Negative for chest pain, palpitations and leg swelling.   Gastrointestinal: Negative for abdominal pain, blood in stool, constipation, diarrhea, nausea and vomiting.   Endocrine: Negative for cold intolerance and heat intolerance.   Genitourinary: Negative for difficulty urinating, dysuria, frequency, hematuria, pelvic pain, urgency and vaginal discharge.   Musculoskeletal: Negative for arthralgias, back pain and joint swelling.   Skin: Negative for rash and wound.   Neurological: Negative for dizziness, weakness, numbness and headaches.   Hematological: Does not bruise/bleed easily.   Psychiatric/Behavioral: Negative for dysphoric mood, sleep disturbance and suicidal ideas. The patient is not nervous/anxious.    I have reviewed the ROS as documented by the MA. Stormy Mclean MD      OBJECTIVE   Vital Signs          BP Readings from Last 1 Encounters:   09/20/19 128/78     Wt Readings from Last 3 Encounters:   09/20/19 59.4 kg (131 lb)   03/18/19 62.6 kg (138 lb)   09/28/18 63 kg (139 lb)   Body mass index is 23.21 kg/m².     Physical Exam   Constitutional: She is oriented to  person, place, and time. Vital signs are normal. She appears well-developed and well-nourished. No distress.   HENT:   Head: Normocephalic.   Cardiovascular: Normal rate, regular rhythm and normal heart sounds.   Pulmonary/Chest: Effort normal and breath sounds normal.   Neurological: She is alert and oriented to person, place, and time. Gait normal.   Psychiatric: She has a normal mood and affect. Her behavior is normal. Judgment and thought content normal.   Vitals reviewed.    Data Reviewed

## 2019-09-20 NOTE — ASSESSMENT & PLAN NOTE
[facility] 9/20/2019  This is worse with our national state. We discussed some strategies to cope. She can increases her citalopram to 20 mg if she needs to.

## 2019-09-23 ENCOUNTER — LAB (OUTPATIENT)
Dept: FAMILY MEDICINE CLINIC | Facility: CLINIC | Age: 64
End: 2019-09-23

## 2019-09-23 DIAGNOSIS — E03.9 ACQUIRED HYPOTHYROIDISM: ICD-10-CM

## 2019-09-23 DIAGNOSIS — Z00.00 PREVENTATIVE HEALTH CARE: ICD-10-CM

## 2019-09-23 DIAGNOSIS — E78.2 MIXED HYPERLIPIDEMIA: ICD-10-CM

## 2019-09-24 LAB
ALBUMIN SERPL-MCNC: 4.5 G/DL (ref 3.5–5.2)
ALBUMIN/GLOB SERPL: 2.1 G/DL
ALP SERPL-CCNC: 52 U/L (ref 39–117)
ALT SERPL-CCNC: 13 U/L (ref 1–33)
AST SERPL-CCNC: 12 U/L (ref 1–32)
BASOPHILS # BLD AUTO: 0.05 10*3/MM3 (ref 0–0.2)
BASOPHILS NFR BLD AUTO: 1 % (ref 0–1.5)
BILIRUB SERPL-MCNC: 0.3 MG/DL (ref 0.2–1.2)
BUN SERPL-MCNC: 12 MG/DL (ref 8–23)
BUN/CREAT SERPL: 13.5 (ref 7–25)
CALCIUM SERPL-MCNC: 9.3 MG/DL (ref 8.6–10.5)
CHLORIDE SERPL-SCNC: 102 MMOL/L (ref 98–107)
CHOLEST SERPL-MCNC: 240 MG/DL (ref 0–200)
CO2 SERPL-SCNC: 25.9 MMOL/L (ref 22–29)
CREAT SERPL-MCNC: 0.89 MG/DL (ref 0.57–1)
EOSINOPHIL # BLD AUTO: 0.25 10*3/MM3 (ref 0–0.4)
EOSINOPHIL NFR BLD AUTO: 5 % (ref 0.3–6.2)
ERYTHROCYTE [DISTWIDTH] IN BLOOD BY AUTOMATED COUNT: 13.2 % (ref 12.3–15.4)
GLOBULIN SER CALC-MCNC: 2.1 GM/DL
GLUCOSE SERPL-MCNC: 98 MG/DL (ref 65–99)
HCT VFR BLD AUTO: 39.4 % (ref 34–46.6)
HDLC SERPL-MCNC: 56 MG/DL (ref 40–60)
HGB BLD-MCNC: 13.1 G/DL (ref 12–15.9)
IMM GRANULOCYTES # BLD AUTO: 0.01 10*3/MM3 (ref 0–0.05)
IMM GRANULOCYTES NFR BLD AUTO: 0.2 % (ref 0–0.5)
LDLC SERPL CALC-MCNC: 155 MG/DL (ref 0–100)
LDLC/HDLC SERPL: 2.78 {RATIO}
LYMPHOCYTES # BLD AUTO: 1.16 10*3/MM3 (ref 0.7–3.1)
LYMPHOCYTES NFR BLD AUTO: 23 % (ref 19.6–45.3)
MCH RBC QN AUTO: 30.5 PG (ref 26.6–33)
MCHC RBC AUTO-ENTMCNC: 33.2 G/DL (ref 31.5–35.7)
MCV RBC AUTO: 91.8 FL (ref 79–97)
MONOCYTES # BLD AUTO: 0.48 10*3/MM3 (ref 0.1–0.9)
MONOCYTES NFR BLD AUTO: 9.5 % (ref 5–12)
NEUTROPHILS # BLD AUTO: 3.1 10*3/MM3 (ref 1.7–7)
NEUTROPHILS NFR BLD AUTO: 61.3 % (ref 42.7–76)
NRBC BLD AUTO-RTO: 0 /100 WBC (ref 0–0.2)
PLATELET # BLD AUTO: 271 10*3/MM3 (ref 140–450)
POTASSIUM SERPL-SCNC: 4.4 MMOL/L (ref 3.5–5.2)
PROT SERPL-MCNC: 6.6 G/DL (ref 6–8.5)
RBC # BLD AUTO: 4.29 10*6/MM3 (ref 3.77–5.28)
SODIUM SERPL-SCNC: 141 MMOL/L (ref 136–145)
TRIGL SERPL-MCNC: 143 MG/DL (ref 0–150)
TSH SERPL DL<=0.005 MIU/L-ACNC: 1.25 UIU/ML (ref 0.27–4.2)
VLDLC SERPL CALC-MCNC: 28.6 MG/DL
WBC # BLD AUTO: 5.05 10*3/MM3 (ref 3.4–10.8)

## 2019-10-11 DIAGNOSIS — F41.8 DEPRESSION WITH ANXIETY: ICD-10-CM

## 2019-10-11 DIAGNOSIS — E03.9 ACQUIRED HYPOTHYROIDISM: ICD-10-CM

## 2019-10-11 DIAGNOSIS — E78.2 MIXED HYPERLIPIDEMIA: ICD-10-CM

## 2019-10-11 RX ORDER — LEVOTHYROXINE SODIUM 0.05 MG/1
50 TABLET ORAL DAILY
Qty: 90 TABLET | Refills: 1 | Status: SHIPPED | OUTPATIENT
Start: 2019-10-11 | End: 2020-04-10

## 2019-10-11 RX ORDER — CITALOPRAM 20 MG/1
10 TABLET ORAL DAILY
Qty: 90 TABLET | Refills: 3 | Status: SHIPPED | OUTPATIENT
Start: 2019-10-11 | End: 2020-09-25 | Stop reason: SDUPTHER

## 2019-10-11 RX ORDER — BUPROPION HYDROCHLORIDE 300 MG/1
300 TABLET ORAL DAILY
Qty: 90 TABLET | Refills: 3 | Status: SHIPPED | OUTPATIENT
Start: 2019-10-11 | End: 2020-09-25 | Stop reason: SDUPTHER

## 2019-10-11 RX ORDER — OMEGA-3-ACID ETHYL ESTERS 1 G/1
1 CAPSULE, LIQUID FILLED ORAL 2 TIMES DAILY
Qty: 180 CAPSULE | Refills: 1 | Status: SHIPPED | OUTPATIENT
Start: 2019-10-11 | End: 2020-04-10

## 2020-04-10 DIAGNOSIS — E78.2 MIXED HYPERLIPIDEMIA: ICD-10-CM

## 2020-04-10 DIAGNOSIS — E03.9 ACQUIRED HYPOTHYROIDISM: ICD-10-CM

## 2020-04-10 RX ORDER — LEVOTHYROXINE SODIUM 0.05 MG/1
TABLET ORAL
Qty: 90 TABLET | Refills: 1 | Status: SHIPPED | OUTPATIENT
Start: 2020-04-10 | End: 2020-09-25 | Stop reason: SDUPTHER

## 2020-04-10 RX ORDER — OMEGA-3-ACID ETHYL ESTERS 1 G/1
CAPSULE, LIQUID FILLED ORAL
Qty: 180 CAPSULE | Refills: 1 | Status: SHIPPED | OUTPATIENT
Start: 2020-04-10 | End: 2020-10-14

## 2020-06-12 NOTE — PATIENT INSTRUCTIONS
GENERAL SURGERY  CONSULT NOTE            Date: 6/12/2020        Patient Name: Hazel Adhikari     YOB: 1956      Age:  59 y.o. Consult by: Dr Vicki Faye to: Dr Selina Platt  Reason:  GIB    Chief Complaint     Chief Complaint   Patient presents with    Other     PEG TUBE FULL OF BLOOD AND LEAKING AROUND IT     History Obtained From   patient    History of Present Illness   Hazel Adhikari is a 59 y.o. female with paraplegia 2/2 multiple sclerosis, on eliquis and brilinta for CAD and DVT found 2/2019, exlap AMY for SBO (2001 Dr Garrett Casey), hysterectomy, and R mastectomy (3/2020, no chemo/rads, Dr Selina Platt) who presents for severe fatigue for unknown duration (she feels more confused lately). GS is consulted for possible bleeding from PEG. Nonbloody diarrhea since 3 days ago, low appetite, and yesterday started having small amount of red blood from granulomas around PEG insertion site as well as coffee grounds in PEG aspirate. None today though, as her eliquis/brilinta have been held and she was started on protonix. Patient has had:  [] coffee ground emesis  [] hematemesis  [] nausea  [] abdominal pain  [] melena  [] hematochezia  [x] diarrhea nonbloody  Last bowel movement was last night.     Last dose of blood thinner : before admission  NSAIDs : no  Steroids/ICU : no  Alcohol : no  Tobacco : no    Prior history of reflux / hemorrhoids / constipation : no  Last EGD: 12/2019 for peg placement, no other findings  Last Cscope: 2/2020 Dr Selina Platt found some sigmoid diverticulosis  Weight loss: no (gained)    Personal or family hx of:  [] crohn's   [] ulcerative colitis  [] colon cancer   [x] other: son had 2 years of irritable bowel / diarrhea  [x] No relevant hx    Past Medical History     Past Medical History:   Diagnosis Date    Anxiety     Breast cancer ([facility] [address] 02/2020    right    CAD (coronary artery disease)     Cystitis     Depression     Elizondo catheter status     History of blood transfusion Knee Effusion  Introduction  Knee effusion means that you have extra fluid in your knee. This can cause pain. Your knee may be more difficult to bend and move.  Follow these instructions at home:  · Use crutches as told by your doctor.  · Wear a knee brace as told by your doctor.  · Apply ice to the swollen area:  ¨ Put ice in a plastic bag.  ¨ Place a towel between your skin and the bag.  ¨ Leave the ice on for 20 minutes, 2-3 times per day.  · Keep your knee raised (elevated) when you are sitting or lying down.  · Take medicines only as told by your doctor.  · Do any rehabilitation or strengthening exercises as told by your doctor.  · Rest your knee as told by your doctor. You may start doing your normal activities again when your doctor says it is okay.  · Keep all follow-up visits as told by your doctor. This is important.  Contact a doctor if:  · You continue to have pain in your knee.  Get help right away if:  · You have increased swelling or redness of your knee.  · You have severe pain in your knee.  · You have a fever.  This information is not intended to replace advice given to you by your health care provider. Make sure you discuss any questions you have with your health care provider.  Document Released: 01/20/2012 Document Revised: 05/25/2017 Document Reviewed: 08/03/2015  © 2017 [employer]vier     Yes Ruben Kitchen MD   nitroGLYCERIN (NITROSTAT) 0.4 MG SL tablet up to max of 3 total doses. per peg tube If no relief after 1 dose, call 911. 12/16/19  Yes Ruben Kitchen MD   apixaban (ELIQUIS) 5 MG TABS tablet 1 tablet by Per G Tube route 2 times daily  Patient taking differently: 5 mg by Per G Tube route 2 times daily Hold 2 days per facility 12/16/19  Yes Ruben Kitchen MD   FLUoxetine (PROZAC) 20 MG capsule 1 capsule by PEG Tube route 2 times daily  Patient taking differently: 20 mg by PEG Tube route 2 times daily Take am dos 12/16/19  Yes Ruben Kitchen MD   atorvastatin (LIPITOR) 80 MG tablet 1 tablet by PEG Tube route nightly 12/16/19  Yes Ruben Kitchen MD   cloNIDine (CATAPRES) 0.2 MG tablet 1 tablet by PEG Tube route every 6 hours as needed (CYSYZHRH>308 or VQOEWBHBW>172)  Patient taking differently: 0.1 mg by PEG Tube route every 6 hours as needed (BSSOPVCG>816 or QWLURYZRT>347)  43/99/18  Yes Ruben Kitchen MD   metoprolol tartrate (LOPRESSOR) 25 MG tablet 1 tablet by PEG Tube route 2 times daily  Patient taking differently: 25 mg by PEG Tube route 2 times daily Take am dos 12/16/19  Yes Ruben Kitchen MD   furosemide (LASIX) 20 MG tablet 1 tablet by PEG Tube route daily 12/16/19  Yes Ruben Kitchen MD   magnesium hydroxide (MILK OF MAGNESIA) 400 MG/5ML suspension 30 mLs by Per G Tube route daily as needed for Constipation  Patient taking differently: 30 mLs by Per G Tube route daily as needed for Constipation If no BM in 48 hours 12/16/19  Yes Ruben Kitchen MD   Psyllium (METAMUCIL) 28.3 % POWD 862 g by PEG Tube route daily Indications: oral one tbsp in 8 oz of liquid 12/16/19  Yes Ruben Kitchen MD   Cranberry 500 MG CAPS 1 capsule by PEG Tube route daily 12/16/19  Yes Ruben Kitchen MD   potassium bicarbonate (K-LYTE) 25 MEQ disintegrating tablet 1 tablet by PEG Tube route 2 times daily 12/16/19  Yes Ruben Kitchen MD   ticagrelor (BRILINTA) 90 MG TABS tablet 1 tablet by PEG Tube route 2 times daily  Patient taking differently: 90 aneurysm or dissection. Lungs: Minimal bibasilar atelectasis or scarring. Minimal ground-glass opacities within the upper and mid lung zones, possibly secondary to pneumonitis, although hydrostatic/cardiogenic pulmonary edema could produce this appearance. Pleural space: Unremarkable. No pneumothorax. No pleural effusion. Heart: Moderate three-vessel coronary artery atherosclerotic disease. Lymph nodes: Unremarkable. No enlarged lymph nodes. Kidneys and ureters: Lobulation of the visualized renal parenchyma bilaterally, possibly developmental versus scarring. Bones/joints: Unremarkable. No acute fracture. Soft tissues: Unremarkable. 1. No acute pulmonary emboli. 2. Minimal ground-glass opacities within the upper and mid lung zones, possibly secondary to pneumonitis, although hydrostatic/cardiogenic pulmonary edema could produce this appearance. This report has been electronically signed by Nicole Steward MD.      [address] Problems           Last Modified POA    GI bleed 6/12/2020 Yes          59 y.o. female with transient trace gastric bleeding likely 2/2 irritation from covid gastroenteritis. Stopped as eliquis/brilinta were held and protonix started. Hgb is baseline and stable. Plan   - PPI, ok to resume her anticoagulants  - monitor hgb daily  - transfuse prn <7 per primary  - diet as tolerated    Plan was discussed with Dr. Robbie Harley.     Electronically signed by Maria A Jarrell MD on 6/12/20 at 10:08 AM EDT    Seen/examined  Agree with above  JSGadyMD

## 2020-09-10 DIAGNOSIS — E03.9 ACQUIRED HYPOTHYROIDISM: ICD-10-CM

## 2020-09-10 DIAGNOSIS — R53.83 FATIGUE, UNSPECIFIED TYPE: ICD-10-CM

## 2020-09-10 DIAGNOSIS — E78.2 MIXED HYPERLIPIDEMIA: Primary | ICD-10-CM

## 2020-09-11 ENCOUNTER — RESULTS ENCOUNTER (OUTPATIENT)
Dept: FAMILY MEDICINE CLINIC | Facility: CLINIC | Age: 65
End: 2020-09-11

## 2020-09-11 DIAGNOSIS — E78.2 MIXED HYPERLIPIDEMIA: ICD-10-CM

## 2020-09-11 DIAGNOSIS — R53.83 FATIGUE, UNSPECIFIED TYPE: ICD-10-CM

## 2020-09-11 DIAGNOSIS — E03.9 ACQUIRED HYPOTHYROIDISM: ICD-10-CM

## 2020-09-11 LAB
BASOPHILS # BLD AUTO: 0.08 10*3/MM3 (ref 0–0.2)
BASOPHILS NFR BLD AUTO: 1 % (ref 0–1.5)
EOSINOPHIL # BLD AUTO: 0.39 10*3/MM3 (ref 0–0.4)
EOSINOPHIL NFR BLD AUTO: 5.1 % (ref 0.3–6.2)
ERYTHROCYTE [DISTWIDTH] IN BLOOD BY AUTOMATED COUNT: 12.9 % (ref 12.3–15.4)
HCT VFR BLD AUTO: 41.6 % (ref 34–46.6)
HGB BLD-MCNC: 13.6 G/DL (ref 12–15.9)
IMM GRANULOCYTES # BLD AUTO: 0.02 10*3/MM3 (ref 0–0.05)
IMM GRANULOCYTES NFR BLD AUTO: 0.3 % (ref 0–0.5)
LYMPHOCYTES # BLD AUTO: 2.22 10*3/MM3 (ref 0.7–3.1)
LYMPHOCYTES NFR BLD AUTO: 28.9 % (ref 19.6–45.3)
MCH RBC QN AUTO: 30.4 PG (ref 26.6–33)
MCHC RBC AUTO-ENTMCNC: 32.7 G/DL (ref 31.5–35.7)
MCV RBC AUTO: 93.1 FL (ref 79–97)
MONOCYTES # BLD AUTO: 0.48 10*3/MM3 (ref 0.1–0.9)
MONOCYTES NFR BLD AUTO: 6.3 % (ref 5–12)
NEUTROPHILS # BLD AUTO: 4.49 10*3/MM3 (ref 1.7–7)
NEUTROPHILS NFR BLD AUTO: 58.4 % (ref 42.7–76)
NRBC BLD AUTO-RTO: 0 /100 WBC (ref 0–0.2)
PLATELET # BLD AUTO: 330 10*3/MM3 (ref 140–450)
RBC # BLD AUTO: 4.47 10*6/MM3 (ref 3.77–5.28)
WBC # BLD AUTO: 7.68 10*3/MM3 (ref 3.4–10.8)

## 2020-09-12 LAB
ALBUMIN SERPL-MCNC: 4.4 G/DL (ref 3.5–5.2)
ALBUMIN/GLOB SERPL: 2 G/DL
ALP SERPL-CCNC: 87 U/L (ref 39–117)
ALT SERPL-CCNC: 12 U/L (ref 1–33)
AST SERPL-CCNC: 11 U/L (ref 1–32)
BILIRUB SERPL-MCNC: 0.3 MG/DL (ref 0–1.2)
BUN SERPL-MCNC: 11 MG/DL (ref 8–23)
BUN/CREAT SERPL: 12.5 (ref 7–25)
CALCIUM SERPL-MCNC: 9.9 MG/DL (ref 8.6–10.5)
CHLORIDE SERPL-SCNC: 103 MMOL/L (ref 98–107)
CHOLEST SERPL-MCNC: 252 MG/DL (ref 0–200)
CHOLEST/HDLC SERPL: 4.2 {RATIO}
CO2 SERPL-SCNC: 27 MMOL/L (ref 22–29)
CREAT SERPL-MCNC: 0.88 MG/DL (ref 0.57–1)
GLOBULIN SER CALC-MCNC: 2.2 GM/DL
GLUCOSE SERPL-MCNC: 100 MG/DL (ref 65–99)
HDLC SERPL-MCNC: 60 MG/DL (ref 40–60)
LDLC SERPL CALC-MCNC: 155 MG/DL (ref 0–100)
POTASSIUM SERPL-SCNC: 4.2 MMOL/L (ref 3.5–5.2)
PROT SERPL-MCNC: 6.6 G/DL (ref 6–8.5)
SODIUM SERPL-SCNC: 140 MMOL/L (ref 136–145)
TRIGL SERPL-MCNC: 184 MG/DL (ref 0–150)
TSH SERPL DL<=0.005 MIU/L-ACNC: 0.61 UIU/ML (ref 0.27–4.2)
VLDLC SERPL CALC-MCNC: 36.8 MG/DL (ref 5–40)

## 2020-09-23 NOTE — PROGRESS NOTES
Assessment and Plan:     Problem List Items Addressed This Visit        Cardiovascular and Mediastinum    Hyperlipidemia       Endocrine    Hypothyroidism    Relevant Medications    levothyroxine (SYNTHROID, LEVOTHROID) 50 MCG tablet       Other    Depression with anxiety    Relevant Medications    buPROPion XL (WELLBUTRIN XL) 300 MG 24 hr tablet    citalopram (CeleXA) 20 MG tablet      Other Visit Diagnoses     Healthcare maintenance    -  Primary    Mass of head        Relevant Orders    MRI Brain With & Without Contrast        No follow-ups on file.  Patient was given instructions and counseling regarding her condition or for health maintenance advice. Please see specific information pulled into the AVS if appropriate.        SUBJECTIVE   Ciara Alonzo is a 65 y.o. female being seen today for Annual Exam               Social History  She  reports that she has never smoked. She has never used smokeless tobacco. She reports current alcohol use. She reports that she does not use drugs.    History of the Present Illness   HPI Annual Exam-Postmenopausal:     Ciara Alonzo 65 y.o.female presents for annual exam.     Last pap: approximate date 11/19 and was normal  Last mammogramapproximate date 1/2020 and was normal  The patient is not taking hormone replacement therapy. Patient denies post-menopausal vaginal bleeding.   MARITAL STATUS:   The patient wears seatbelts: yes.    The patient has regular exercise: yes.    She does elliptical, pilates, yoga    Exercise: 3 times/week.  Practicing social distancing, handwashing and keeping hands from face? yes  This patient has ever been tested for HepC: No but we did not add to labs.  .   Dental Exam. up to date  colonoscopy 3 years ago with abnormalities.  Her immunization are up-to-date.  She is eating a healthy diet.   Labs results were discussed     Patient mentioned that when she was in the emergency room in June with a broken wrist and significantly banged up  face she was told that her head CT had an abnormality but needed further follow-up.  On review of that CT scan today there is an incidentally noted hypodense lesion at the right frontal calvarium.  This was done at [facility] and I am unable to visualize it.  It did recommend an MRI examination with contrast for additional assessment.  That is ordered today.  See details at the end of the note for specific reading on that area.  Significant Past History  The following portions of the patient's history were reviewed and updated as appropriate:PMHroutine: Social history , Past Medical History, Surgical history , Allergies, Current Medications, Active Problem List, Family History and Health Maintenance    Review of Systems   Constitutional: Negative for activity change, appetite change, chills, fatigue, fever and unexpected weight change.   HENT: Negative for congestion, ear pain, hearing loss, nosebleeds, rhinorrhea and sore throat.    Eyes: Negative for pain, redness and visual disturbance.   Respiratory: Negative for cough, shortness of breath and wheezing.    Cardiovascular: Negative for chest pain, palpitations and leg swelling.   Gastrointestinal: Negative for abdominal pain, blood in stool, constipation, diarrhea, nausea and vomiting.   Endocrine: Negative for cold intolerance and heat intolerance.   Genitourinary: Negative for difficulty urinating, dysuria, frequency, hematuria, pelvic pain, urgency and vaginal discharge.   Musculoskeletal: Negative for arthralgias, back pain and joint swelling.   Skin: Negative for rash and wound.   Neurological: Negative for dizziness, weakness, numbness and headaches.   Hematological: Does not bruise/bleed easily.   Psychiatric/Behavioral: Negative for dysphoric mood, sleep disturbance and suicidal ideas. The patient is not nervous/anxious.    I have reviewed the ROS as documented by the MA. Stormy Mclean MD    The 10-year ASCVD risk score ([place] BART Jr., et al., 2013) is:  6.3%    Values used to calculate the score:      Age: 65 years      Sex: Female      Is Non- : No      Diabetic: No      Tobacco smoker: No      Systolic Blood Pressure: 138 mmHg      Is BP treated: No      HDL Cholesterol: 60 mg/dL      Total Cholesterol: 252 mg/dL    OBJECTIVE  Vital Signs          BP Readings from Last 1 Encounters:   09/25/20 138/78     Wt Readings from Last 3 Encounters:   09/25/20 65.4 kg (144 lb 1.6 oz)   09/20/19 59.4 kg (131 lb)   03/18/19 62.6 kg (138 lb)   Body mass index is 25.53 kg/m².     Physical Exam  Vitals signs reviewed.   Constitutional:       General: She is not in acute distress.     Appearance: She is well-developed.   HENT:      Head: Normocephalic and atraumatic.      Right Ear: Tympanic membrane, ear canal and external ear normal.      Left Ear: Tympanic membrane, ear canal and external ear normal.   Eyes:      Conjunctiva/sclera: Conjunctivae normal.   Neck:      Musculoskeletal: Normal range of motion and neck supple.      Thyroid: No thyromegaly.      Trachea: No tracheal deviation.   Cardiovascular:      Rate and Rhythm: Normal rate and regular rhythm.      Heart sounds: Normal heart sounds.   Pulmonary:      Effort: Pulmonary effort is normal. No respiratory distress.      Breath sounds: Normal breath sounds. No wheezing or rales.   Abdominal:      General: Bowel sounds are normal. There is no distension.      Palpations: Abdomen is soft.      Tenderness: There is no abdominal tenderness.   Musculoskeletal:         General: No deformity.   Lymphadenopathy:      Cervical: No cervical adenopathy.   Skin:     General: Skin is warm and dry.   Neurological:      Mental Status: She is alert and oriented to person, place, and time.   Psychiatric:         Behavior: Behavior normal.         Thought Content: Thought content normal.         Judgment: Judgment normal.               CBC & Differential (09/11/2020 08:57)  Lipid Panel With / Chol / HDL  Ratio  Comprehensive metabolic panel   TSH      FACILITY:  [facility]  UNIT/AGE/GENDER: AUNDREA  ER      AGE:65 Y          SEX:F  PATIENT NAME/:  RAJANI PHAM H    1955    EXAMINATION(S): CT HEAD WO CONTRAST  DATE: 2020    HISTORY: Head trauma, mod-severe. Pain. Initial encounter.    CT head: There is mild soft tissue swelling at the left supraorbital region with associated small laceration. There is no acute intracranial hemorrhage, mass effect or midline shift. No extra-axial fluid collections are noted. The basal cisterns are   patent. No hydrocephalus. There is mild cerebral atrophy. Imaged paranasal sinuses and mastoid air cells are clear. The calvarium is intact. There is a 0.9 x 2.6 cm hypodense lesion at the right frontal calvarium with subtle endosteal scalloping and   expansion of the major is nonspecific. No priors for comparison.    IMPRESSION:  1. Small left supraorbital tissue contusion and laceration.  2. No acute ischemic infarction hemorrhage or unenhanced mass lesion.  3. Incidentally noted is hypodense lesion at the right frontal calvarium mild expansion of medullary space and endosteal scalloping that is nonspecific. No priors for comparison. If no other prior exams are available for comparison at outside   institutions, follow-up nonemergent/outpatient MRI examination with contrast could be obtained for additional assessment.

## 2020-09-25 ENCOUNTER — OFFICE VISIT (OUTPATIENT)
Dept: FAMILY MEDICINE CLINIC | Facility: CLINIC | Age: 65
End: 2020-09-25

## 2020-09-25 VITALS
HEIGHT: 63 IN | BODY MASS INDEX: 25.53 KG/M2 | SYSTOLIC BLOOD PRESSURE: 138 MMHG | OXYGEN SATURATION: 98 % | WEIGHT: 144.1 LBS | DIASTOLIC BLOOD PRESSURE: 78 MMHG | RESPIRATION RATE: 14 BRPM | TEMPERATURE: 98.2 F | HEART RATE: 84 BPM

## 2020-09-25 DIAGNOSIS — Z00.00 HEALTHCARE MAINTENANCE: Primary | ICD-10-CM

## 2020-09-25 DIAGNOSIS — E03.9 ACQUIRED HYPOTHYROIDISM: ICD-10-CM

## 2020-09-25 DIAGNOSIS — R22.0 MASS OF HEAD: ICD-10-CM

## 2020-09-25 DIAGNOSIS — F41.8 DEPRESSION WITH ANXIETY: ICD-10-CM

## 2020-09-25 DIAGNOSIS — E78.2 MIXED HYPERLIPIDEMIA: ICD-10-CM

## 2020-09-25 PROBLEM — Z79.811 AROMATASE INHIBITOR USE: Status: ACTIVE | Noted: 2020-09-14

## 2020-09-25 PROCEDURE — 99213 OFFICE O/P EST LOW 20 MIN: CPT | Performed by: FAMILY MEDICINE

## 2020-09-25 PROCEDURE — 90471 IMMUNIZATION ADMIN: CPT | Performed by: FAMILY MEDICINE

## 2020-09-25 PROCEDURE — 90686 IIV4 VACC NO PRSV 0.5 ML IM: CPT | Performed by: FAMILY MEDICINE

## 2020-09-25 PROCEDURE — 99396 PREV VISIT EST AGE 40-64: CPT | Performed by: FAMILY MEDICINE

## 2020-09-25 RX ORDER — LEVOTHYROXINE SODIUM 0.05 MG/1
50 TABLET ORAL DAILY
Qty: 90 TABLET | Refills: 3 | Status: SHIPPED | OUTPATIENT
Start: 2020-09-25 | End: 2021-09-29 | Stop reason: SDUPTHER

## 2020-09-25 RX ORDER — BUPROPION HYDROCHLORIDE 300 MG/1
300 TABLET ORAL DAILY
Qty: 90 TABLET | Refills: 3 | Status: SHIPPED | OUTPATIENT
Start: 2020-09-25 | End: 2021-09-29 | Stop reason: SDUPTHER

## 2020-09-25 RX ORDER — CITALOPRAM 20 MG/1
10 TABLET ORAL DAILY
Qty: 90 TABLET | Refills: 3 | Status: SHIPPED | OUTPATIENT
Start: 2020-09-25 | End: 2021-11-22 | Stop reason: SDUPTHER

## 2020-09-25 NOTE — PATIENT INSTRUCTIONS
Total Cholesterol   Date Value Ref Range Status   09/11/2020 252 (H) 0 - 200 mg/dL Final     LDL Chol Calc (NIH)   Date Value Ref Range Status   09/11/2020 155 (H) 0 - 100 mg/dL Final     HDL Cholesterol   Date Value Ref Range Status   09/11/2020 60 40 - 60 mg/dL Final     Triglycerides   Date Value Ref Range Status   09/11/2020 184 (H) 0 - 150 mg/dL Final

## 2020-10-13 ENCOUNTER — APPOINTMENT (OUTPATIENT)
Dept: OTHER | Facility: HOSPITAL | Age: 65
End: 2020-10-13

## 2020-10-13 ENCOUNTER — HOSPITAL ENCOUNTER (OUTPATIENT)
Dept: MRI IMAGING | Facility: HOSPITAL | Age: 65
Discharge: HOME | End: 2020-10-13

## 2020-10-13 DIAGNOSIS — Z09 FOLLOW UP: ICD-10-CM

## 2020-10-13 DIAGNOSIS — R22.0 MASS OF HEAD: ICD-10-CM

## 2020-10-13 LAB — CREAT BLDA-MCNC: 1.1 MG/DL (ref 0.6–1.3)

## 2020-10-13 PROCEDURE — 70553 MRI BRAIN STEM W/O & W/DYE: CPT

## 2020-10-13 PROCEDURE — 0 GADOBENATE DIMEGLUMINE 529 MG/ML SOLUTION: Performed by: FAMILY MEDICINE

## 2020-10-13 PROCEDURE — 82565 ASSAY OF CREATININE: CPT

## 2020-10-13 PROCEDURE — A9577 INJ MULTIHANCE: HCPCS | Performed by: FAMILY MEDICINE

## 2020-10-13 RX ADMIN — GADOBENATE DIMEGLUMINE 13 ML: 529 INJECTION, SOLUTION INTRAVENOUS at 08:14

## 2020-10-14 DIAGNOSIS — E78.2 MIXED HYPERLIPIDEMIA: ICD-10-CM

## 2020-10-14 RX ORDER — OMEGA-3-ACID ETHYL ESTERS 1 G/1
CAPSULE, LIQUID FILLED ORAL
Qty: 180 CAPSULE | Refills: 1 | Status: SHIPPED | OUTPATIENT
Start: 2020-10-14 | End: 2021-12-03 | Stop reason: SDUPTHER

## 2021-03-22 ENCOUNTER — BULK ORDERING (OUTPATIENT)
Dept: CASE MANAGEMENT | Facility: OTHER | Age: 66
End: 2021-03-22

## 2021-03-22 DIAGNOSIS — Z23 IMMUNIZATION DUE: ICD-10-CM

## 2021-04-12 ENCOUNTER — TELEPHONE (OUTPATIENT)
Dept: GASTROENTEROLOGY | Facility: CLINIC | Age: 66
End: 2021-04-12

## 2021-04-12 NOTE — TELEPHONE ENCOUNTER
Last scope 01/30/2018 in [facility]-- no personal or family hx of polyps or  Colon ca- no ASA or blood thinners-- medications:    buPROPion XL (WELLBUTRIN XL) 300 MG 24 hr tablet  citalopram (CeleXA) 20 MG tablet  letrozole (FEMARA) 2.5 MG tablet  levothyroxine (SYNTHROID, LEVOTHROID) 50 MCG tablet  omega-3 acid ethyl esters (LOVAZA) 1 g capsule  Calcium  Glucosamine   Vitamin D3    OA form scanned into media

## 2021-04-15 DIAGNOSIS — Z86.010 PERSONAL HISTORY OF COLONIC POLYPS: Primary | ICD-10-CM

## 2021-04-15 RX ORDER — SODIUM CHLORIDE, SODIUM LACTATE, POTASSIUM CHLORIDE, CALCIUM CHLORIDE 600; 310; 30; 20 MG/100ML; MG/100ML; MG/100ML; MG/100ML
30 INJECTION, SOLUTION INTRAVENOUS CONTINUOUS
Status: CANCELLED | OUTPATIENT
Start: 2021-06-15

## 2021-04-19 ENCOUNTER — TELEPHONE (OUTPATIENT)
Dept: GASTROENTEROLOGY | Facility: CLINIC | Age: 66
End: 2021-04-19

## 2021-04-22 PROBLEM — Z86.0100 PERSONAL HISTORY OF COLONIC POLYPS: Status: ACTIVE | Noted: 2021-04-22

## 2021-04-22 PROBLEM — Z86.010 PERSONAL HISTORY OF COLONIC POLYPS: Status: ACTIVE | Noted: 2021-04-22

## 2021-04-22 NOTE — TELEPHONE ENCOUNTER
spoke with pt scheduled at [facility] on june15 arrive at 0910 am tyler boggs-sandoval

## 2021-05-17 ENCOUNTER — TRANSCRIBE ORDERS (OUTPATIENT)
Dept: LAB | Facility: HOSPITAL | Age: 66
End: 2021-05-17

## 2021-05-17 DIAGNOSIS — Z01.818 OTHER SPECIFIED PRE-OPERATIVE EXAMINATION: Primary | ICD-10-CM

## 2021-05-21 ENCOUNTER — TRANSCRIBE ORDERS (OUTPATIENT)
Dept: SLEEP MEDICINE | Facility: HOSPITAL | Age: 66
End: 2021-05-21

## 2021-05-21 DIAGNOSIS — Z01.818 OTHER SPECIFIED PRE-OPERATIVE EXAMINATION: Primary | ICD-10-CM

## 2021-06-12 ENCOUNTER — LAB (OUTPATIENT)
Dept: LAB | Facility: HOSPITAL | Age: 66
End: 2021-06-12

## 2021-06-12 DIAGNOSIS — Z01.818 OTHER SPECIFIED PRE-OPERATIVE EXAMINATION: ICD-10-CM

## 2021-06-12 LAB — SARS-COV-2 ORF1AB RESP QL NAA+PROBE: NOT DETECTED

## 2021-06-12 PROCEDURE — C9803 HOPD COVID-19 SPEC COLLECT: HCPCS

## 2021-06-12 PROCEDURE — U0004 COV-19 TEST NON-CDC HGH THRU: HCPCS

## 2021-06-15 ENCOUNTER — ANESTHESIA (OUTPATIENT)
Dept: GASTROENTEROLOGY | Facility: HOSPITAL | Age: 66
End: 2021-06-15

## 2021-06-15 ENCOUNTER — ANESTHESIA EVENT (OUTPATIENT)
Dept: GASTROENTEROLOGY | Facility: HOSPITAL | Age: 66
End: 2021-06-15

## 2021-06-15 ENCOUNTER — HOSPITAL ENCOUNTER (OUTPATIENT)
Facility: HOSPITAL | Age: 66
Setting detail: HOSPITAL OUTPATIENT SURGERY
Discharge: HOME | End: 2021-06-15
Attending: INTERNAL MEDICINE | Admitting: INTERNAL MEDICINE

## 2021-06-15 VITALS
OXYGEN SATURATION: 98 % | SYSTOLIC BLOOD PRESSURE: 101 MMHG | HEIGHT: 63 IN | HEART RATE: 76 BPM | RESPIRATION RATE: 16 BRPM | DIASTOLIC BLOOD PRESSURE: 57 MMHG | TEMPERATURE: 98.2 F | BODY MASS INDEX: 24.45 KG/M2 | WEIGHT: 138 LBS

## 2021-06-15 DIAGNOSIS — Z86.010 PERSONAL HISTORY OF COLONIC POLYPS: ICD-10-CM

## 2021-06-15 PROCEDURE — 25010000002 PHENYLEPHRINE PER 1 ML: Performed by: NURSE ANESTHETIST, CERTIFIED REGISTERED

## 2021-06-15 PROCEDURE — 25010000002 PROPOFOL 10 MG/ML EMULSION: Performed by: NURSE ANESTHETIST, CERTIFIED REGISTERED

## 2021-06-15 PROCEDURE — 88305 TISSUE EXAM BY PATHOLOGIST: CPT | Performed by: INTERNAL MEDICINE

## 2021-06-15 PROCEDURE — S0260 H&P FOR SURGERY: HCPCS | Performed by: INTERNAL MEDICINE

## 2021-06-15 PROCEDURE — 45380 COLONOSCOPY AND BIOPSY: CPT | Performed by: INTERNAL MEDICINE

## 2021-06-15 RX ORDER — PROPOFOL 10 MG/ML
VIAL (ML) INTRAVENOUS AS NEEDED
Status: DISCONTINUED | OUTPATIENT
Start: 2021-06-15 | End: 2021-06-15 | Stop reason: SURG

## 2021-06-15 RX ORDER — PROPOFOL 10 MG/ML
VIAL (ML) INTRAVENOUS CONTINUOUS PRN
Status: DISCONTINUED | OUTPATIENT
Start: 2021-06-15 | End: 2021-06-15 | Stop reason: SURG

## 2021-06-15 RX ORDER — SODIUM CHLORIDE 0.9 % (FLUSH) 0.9 %
3 SYRINGE (ML) INJECTION EVERY 12 HOURS SCHEDULED
Status: DISCONTINUED | OUTPATIENT
Start: 2021-06-15 | End: 2021-06-15 | Stop reason: HOSPADM

## 2021-06-15 RX ORDER — LIDOCAINE HYDROCHLORIDE 20 MG/ML
INJECTION, SOLUTION INFILTRATION; PERINEURAL AS NEEDED
Status: DISCONTINUED | OUTPATIENT
Start: 2021-06-15 | End: 2021-06-15 | Stop reason: SURG

## 2021-06-15 RX ORDER — SODIUM CHLORIDE 0.9 % (FLUSH) 0.9 %
10 SYRINGE (ML) INJECTION AS NEEDED
Status: DISCONTINUED | OUTPATIENT
Start: 2021-06-15 | End: 2021-06-15 | Stop reason: HOSPADM

## 2021-06-15 RX ORDER — EPHEDRINE SULFATE 50 MG/ML
INJECTION, SOLUTION INTRAVENOUS AS NEEDED
Status: DISCONTINUED | OUTPATIENT
Start: 2021-06-15 | End: 2021-06-15 | Stop reason: SURG

## 2021-06-15 RX ORDER — SODIUM CHLORIDE, SODIUM LACTATE, POTASSIUM CHLORIDE, CALCIUM CHLORIDE 600; 310; 30; 20 MG/100ML; MG/100ML; MG/100ML; MG/100ML
30 INJECTION, SOLUTION INTRAVENOUS CONTINUOUS
Status: DISCONTINUED | OUTPATIENT
Start: 2021-06-15 | End: 2021-06-15 | Stop reason: HOSPADM

## 2021-06-15 RX ADMIN — EPHEDRINE SULFATE 25 MG: 50 INJECTION INTRAVENOUS at 10:45

## 2021-06-15 RX ADMIN — PROPOFOL 40 MG: 10 INJECTION, EMULSION INTRAVENOUS at 10:09

## 2021-06-15 RX ADMIN — SODIUM CHLORIDE, POTASSIUM CHLORIDE, SODIUM LACTATE AND CALCIUM CHLORIDE 30 ML/HR: 600; 310; 30; 20 INJECTION, SOLUTION INTRAVENOUS at 10:02

## 2021-06-15 RX ADMIN — LIDOCAINE HYDROCHLORIDE 60 MG: 20 INJECTION, SOLUTION INFILTRATION; PERINEURAL at 10:09

## 2021-06-15 RX ADMIN — PROPOFOL 300 MCG/KG/MIN: 10 INJECTION, EMULSION INTRAVENOUS at 10:09

## 2021-06-15 RX ADMIN — PHENYLEPHRINE HYDROCHLORIDE 100 MCG: 10 INJECTION INTRAVENOUS at 10:13

## 2021-06-15 NOTE — ANESTHESIA PREPROCEDURE EVALUATION
Anesthesia Evaluation     Patient summary reviewed and Nursing notes reviewed   history of anesthetic complications: PONV  NPO Solid Status: > 8 hours  NPO Liquid Status: > 2 hours           Airway   Mallampati: I  TM distance: >3 FB  Neck ROM: full  No difficulty expected  Dental - normal exam     Pulmonary - negative pulmonary ROS and normal exam   Cardiovascular - normal exam    (+) hyperlipidemia,       Neuro/Psych  (+) headaches, psychiatric history Anxiety and Depression,     GI/Hepatic/Renal/Endo    (+)   thyroid problem hypothyroidism    Musculoskeletal (-) negative ROS    Abdominal  - normal exam    Bowel sounds: normal.   Substance History - negative use     OB/GYN negative ob/gyn ROS         Other                      Anesthesia Plan    ASA 2     MAC       Anesthetic plan, all risks, benefits, and alternatives have been provided, discussed and informed consent has been obtained with: patient.

## 2021-06-15 NOTE — H&P
[facility]  Pre Procedure History & Physical    Chief Complaint: Personal history of polyps      HPI: 65 yo W here for colonoscopy.  She had a colonoscopy in January 2018, limited by fair to poor prep.  1 polyp was removed at that time.  She is here for surveillance.  She otherwise feels well.    Past Medical History:   Past Medical History:   Diagnosis Date   • Anemia    • Arthritis     left knee   • Cancer (CMS/HCC)     CERVICAL   • Cancer (CMS/HCC)     right breast   • Disease of thyroid gland    • Metacarpal bone fracture    • Pain in joint of left shoulder    • PONV (postoperative nausea and vomiting)        Family History:  Family History   Problem Relation Age of Onset   • Hypertension Mother    • Malig Hyperthermia Neg Hx        Social History:   reports that she has never smoked. She has never used smokeless tobacco. She reports current alcohol use. She reports that she does not use drugs.    Medications:   No medications prior to admission.       Allergies:  Patient has no known allergies.    ROS:    Pertinent items are noted in HPI     Objective     not currently breastfeeding.    Physical Exam   Constitutional: Pt is oriented to person, place, and time and well-developed, well-nourished, and in no distress.   HENT:   Mouth/Throat: Oropharynx is clear and moist.   Neck: Normal range of motion. Neck supple.   Cardiovascular: Normal rate, regular rhythm and normal heart sounds.    Pulmonary/Chest: Effort normal and breath sounds normal. No respiratory distress. No  wheezes.   Abdominal: Soft. Bowel sounds are normal.   Skin: Skin is warm and dry.   Psychiatric: Mood, memory, affect and judgment normal.     Assessment/Plan     Diagnosis: Personal history of polyps      Anticipated Surgical Procedure:    Colonoscopy    The risks, benefits, and alternatives of this procedure have been discussed with the patient or the responsible party- the patient understands and agrees to proceed.

## 2021-06-15 NOTE — ANESTHESIA POSTPROCEDURE EVALUATION
"Patient: Ciara Alonzo    Procedure Summary     Date: 06/15/21 Room / Location:  [facility]    Anesthesia Start: 1005 Anesthesia Stop: 1037    Procedure: COLONOSCOPY to cecum and ti with cold bx polypectomy (N/A ) Diagnosis:       Personal history of colonic polyps      (Personal history of colonic polyps [Z86.010])    Surgeons: Mee Hou MD Provider: Edward De Dios MD    Anesthesia Type: MAC ASA Status: 2          Anesthesia Type: MAC    Vitals  Vitals Value Taken Time   /57 06/15/21 1059   Temp     Pulse 76 06/15/21 1059   Resp 16 06/15/21 1059   SpO2 98 % 06/15/21 1059           Post Anesthesia Care and Evaluation    Patient location during evaluation: PACU  Patient participation: complete - patient participated  Level of consciousness: awake  Pain score: 0  Pain management: adequate  Airway patency: patent  Anesthetic complications: No anesthetic complications  PONV Status: none  Cardiovascular status: acceptable  Respiratory status: acceptable  Hydration status: acceptable    Comments: /57 (BP Location: Right arm, Patient Position: Lying)   Pulse 76   Temp 36.8 °C (98.2 °F) (Oral)   Resp 16   Ht 160 cm (63\")   Wt 62.6 kg (138 lb)   LMP  (LMP Unknown)   SpO2 98%   BMI 24.45 kg/m²       "

## 2021-06-16 LAB
CYTO UR: NORMAL
LAB AP CASE REPORT: NORMAL
PATH REPORT.FINAL DX SPEC: NORMAL
PATH REPORT.GROSS SPEC: NORMAL

## 2021-06-16 NOTE — PROGRESS NOTES
The colon polyp was a benign hyperplastic polypIn the absence of symptoms, her next colonoscopy should be in 10 years, please place in recall, thanks

## 2021-06-22 ENCOUNTER — TELEPHONE (OUTPATIENT)
Dept: GASTROENTEROLOGY | Facility: CLINIC | Age: 66
End: 2021-06-22

## 2021-06-22 NOTE — TELEPHONE ENCOUNTER
----- Message from Mee Hou MD sent at 6/16/2021  1:09 PM EDT -----  The colon polyp was a benign hyperplastic polypIn the absence of symptoms, her next colonoscopy should be in 10 years, please place in recall, thanks

## 2021-06-22 NOTE — TELEPHONE ENCOUNTER
Call to pt.  Advise per Dr Hou note.  Verb understanding.     C/s for 6/15/31 placed in recall and HM.

## 2021-09-29 DIAGNOSIS — F41.8 DEPRESSION WITH ANXIETY: ICD-10-CM

## 2021-09-29 DIAGNOSIS — E03.9 ACQUIRED HYPOTHYROIDISM: ICD-10-CM

## 2021-09-29 RX ORDER — BUPROPION HYDROCHLORIDE 300 MG/1
300 TABLET ORAL DAILY
Qty: 30 TABLET | Refills: 0 | Status: SHIPPED | OUTPATIENT
Start: 2021-09-29 | End: 2021-11-01

## 2021-09-29 RX ORDER — LEVOTHYROXINE SODIUM 0.05 MG/1
50 TABLET ORAL DAILY
Qty: 30 TABLET | Refills: 0 | Status: SHIPPED | OUTPATIENT
Start: 2021-09-29 | End: 2021-11-01

## 2021-09-29 NOTE — TELEPHONE ENCOUNTER
Caller: [id number] [place], [place] - [address] - [phone]  - [phone] FX    Relationship: Pharmacy      Medication requested (name and dosage):    levothyroxine (SYNTHROID, LEVOTHROID) 50 MCG tablet     buPROPion XL (WELLBUTRIN XL) 300 MG 24 hr tablet       Pharmacy where request should be sent: [id number] - [place], [place] - [address] - [phone] [facility] [phone] FX  [phone]    Additional details provided by patient: [facility] CALLED NEEDED TO GET NEW FAX    Best call back number [phone]    Does the patient have less than a 3 day supply:  [] Yes  [] No    Yanni Morris Rep   09/29/21 11:05 EDT

## 2021-10-31 DIAGNOSIS — E03.9 ACQUIRED HYPOTHYROIDISM: ICD-10-CM

## 2021-10-31 DIAGNOSIS — F41.8 DEPRESSION WITH ANXIETY: ICD-10-CM

## 2021-11-01 RX ORDER — BUPROPION HYDROCHLORIDE 300 MG/1
300 TABLET ORAL DAILY
Qty: 15 TABLET | Refills: 0 | Status: SHIPPED | OUTPATIENT
Start: 2021-11-01 | End: 2021-11-22 | Stop reason: SDUPTHER

## 2021-11-01 RX ORDER — LEVOTHYROXINE SODIUM 0.05 MG/1
50 TABLET ORAL DAILY
Qty: 15 TABLET | Refills: 0 | Status: SHIPPED | OUTPATIENT
Start: 2021-11-01 | End: 2021-11-22 | Stop reason: SDUPTHER

## 2021-11-17 DIAGNOSIS — E78.2 MIXED HYPERLIPIDEMIA: Primary | ICD-10-CM

## 2021-11-17 DIAGNOSIS — Z13.1 DIABETES MELLITUS SCREENING: ICD-10-CM

## 2021-11-17 DIAGNOSIS — R53.83 FATIGUE, UNSPECIFIED TYPE: ICD-10-CM

## 2021-11-17 DIAGNOSIS — E03.9 ACQUIRED HYPOTHYROIDISM: ICD-10-CM

## 2021-11-17 DIAGNOSIS — Z11.59 NEED FOR HEPATITIS C SCREENING TEST: ICD-10-CM

## 2021-11-22 ENCOUNTER — OFFICE VISIT (OUTPATIENT)
Dept: FAMILY MEDICINE CLINIC | Facility: CLINIC | Age: 66
End: 2021-11-22

## 2021-11-22 VITALS
BODY MASS INDEX: 24.1 KG/M2 | HEART RATE: 90 BPM | HEIGHT: 63 IN | RESPIRATION RATE: 14 BRPM | DIASTOLIC BLOOD PRESSURE: 84 MMHG | OXYGEN SATURATION: 98 % | WEIGHT: 136 LBS | SYSTOLIC BLOOD PRESSURE: 140 MMHG

## 2021-11-22 DIAGNOSIS — F41.8 DEPRESSION WITH ANXIETY: ICD-10-CM

## 2021-11-22 DIAGNOSIS — Z00.00 WELCOME TO MEDICARE PREVENTIVE VISIT: Primary | ICD-10-CM

## 2021-11-22 DIAGNOSIS — Z23 NEED FOR VACCINATION: ICD-10-CM

## 2021-11-22 DIAGNOSIS — E03.9 ACQUIRED HYPOTHYROIDISM: ICD-10-CM

## 2021-11-22 DIAGNOSIS — E78.2 MIXED HYPERLIPIDEMIA: ICD-10-CM

## 2021-11-22 DIAGNOSIS — R03.0 BLOOD PRESSURE ELEVATED WITHOUT HISTORY OF HTN: ICD-10-CM

## 2021-11-22 PROBLEM — Z86.0100 PERSONAL HISTORY OF COLONIC POLYPS: Status: RESOLVED | Noted: 2021-04-22 | Resolved: 2021-11-22

## 2021-11-22 PROBLEM — Z86.010 PERSONAL HISTORY OF COLONIC POLYPS: Status: RESOLVED | Noted: 2021-04-22 | Resolved: 2021-11-22

## 2021-11-22 PROBLEM — Z85.3 HISTORY OF RIGHT BREAST CANCER: Status: ACTIVE | Noted: 2017-07-24

## 2021-11-22 PROCEDURE — 99213 OFFICE O/P EST LOW 20 MIN: CPT | Performed by: FAMILY MEDICINE

## 2021-11-22 PROCEDURE — 1170F FXNL STATUS ASSESSED: CPT | Performed by: FAMILY MEDICINE

## 2021-11-22 PROCEDURE — 90662 IIV NO PRSV INCREASED AG IM: CPT | Performed by: FAMILY MEDICINE

## 2021-11-22 PROCEDURE — G0009 ADMIN PNEUMOCOCCAL VACCINE: HCPCS | Performed by: FAMILY MEDICINE

## 2021-11-22 PROCEDURE — G0008 ADMIN INFLUENZA VIRUS VAC: HCPCS | Performed by: FAMILY MEDICINE

## 2021-11-22 PROCEDURE — 90732 PPSV23 VACC 2 YRS+ SUBQ/IM: CPT | Performed by: FAMILY MEDICINE

## 2021-11-22 PROCEDURE — 1159F MED LIST DOCD IN RCRD: CPT | Performed by: FAMILY MEDICINE

## 2021-11-22 PROCEDURE — G0402 INITIAL PREVENTIVE EXAM: HCPCS | Performed by: FAMILY MEDICINE

## 2021-11-22 RX ORDER — CITALOPRAM 20 MG/1
10 TABLET ORAL DAILY
Qty: 90 TABLET | Refills: 3 | Status: SHIPPED | OUTPATIENT
Start: 2021-11-22 | End: 2022-11-28 | Stop reason: SDUPTHER

## 2021-11-22 RX ORDER — LEVOTHYROXINE SODIUM 0.05 MG/1
50 TABLET ORAL DAILY
Qty: 90 TABLET | Refills: 3 | Status: SHIPPED | OUTPATIENT
Start: 2021-11-22 | End: 2022-11-28 | Stop reason: SDUPTHER

## 2021-11-22 RX ORDER — BUPROPION HYDROCHLORIDE 300 MG/1
300 TABLET ORAL DAILY
Qty: 90 TABLET | Refills: 3 | Status: SHIPPED | OUTPATIENT
Start: 2021-11-22 | End: 2022-11-28 | Stop reason: SDUPTHER

## 2021-11-22 NOTE — PROGRESS NOTES
QUICK REFERENCE INFORMATION:  The ABCs of the Annual Wellness Visit     Subjective   History of Present Illness  Ciara Alonzo is a 66 y.o. female who presents for a Subsequent Wellness Visit.    HEALTH RISK ASSESSMENT    1955  Recent Hospitalizations:  No hospitalization(s) within the last year..  Current Medical Providers:  Patient Care Team:  Stormy Mclean MD as PCP - Mindy Lora MD as Consulting Physician (Obstetrics and Gynecology)  Smoking Status:  Social History     Tobacco Use   Smoking Status Never Smoker   Smokeless Tobacco Never Used     Alcohol Consumption:  Social History     Substance and Sexual Activity   Alcohol Use Yes    Comment: SOCIAL     Fall Risk Screen:  STEADI Fall Risk Assessment was completed, and patient is at LOW risk for falls.Assessment completed on:11/22/2021  Depression Screen:   PHQ-2/PHQ-9 Depression Screening 11/22/2021   Little interest or pleasure in doing things 0   Feeling down, depressed, or hopeless 0   Total Score 0     Health Habits and Functional and Cognitive Screening:  Functional & Cognitive Status 11/22/2021   Do you have difficulty preparing food and eating? No   Do you have difficulty bathing yourself, getting dressed or grooming yourself? No   Do you have difficulty using the toilet? No   Do you have difficulty moving around from place to place? No   Do you have trouble with steps or getting out of a bed or a chair? No   Current Diet Well Balanced Diet   Dental Exam Up to date   Eye Exam Up to date   Exercise (times per week) 2 times per week   Current Exercises Include Walking   Do you need help using the phone?  No   Are you deaf or do you have serious difficulty hearing?  No   Do you need help with transportation? No   Do you need help shopping? No   Do you need help preparing meals?  No   Do you need help with housework?  No   Do you need help with laundry? No   Do you need help taking your medications? No   Do you need help managing  money? No   Do you ever drive or ride in a car without wearing a seat belt? No   Have you felt unusual stress, anger or loneliness in the last month? No   Who do you live with? Spouse   If you need help, do you have trouble finding someone available to you? No   Have you been bothered in the last four weeks by sexual problems? No   Do you have difficulty concentrating, remembering or making decisions? No       Health Habits  Current Diet: Well Balanced Diet  Dental Exam: Up to date  Eye Exam: Up to date  Exercise (times per week): 2 times per week  Current Exercises Include: Walking  Does the patient have evidence of cognitive impairment? No   Aspirin use counseling: Does not need ASA (and currently is not on it)  Recent Lab Results:  Hepatitis C antibody (11/19/2021 11:43)  TSH (11/19/2021 11:43)  Lipid Panel With / Chol / HDL Ratio (11/19/2021 11:43)  Comprehensive Metabolic Panel (11/19/2021 11:43)  CBC & Differential (11/19/2021 11:43)  Visual Acuity:  Sees an ophthalmology   Age-appropriate Screening Schedule:  Refer to the list below for future screening recommendations based on patient's age, sex and/or medical conditions. Orders for these recommended tests are listed in the plan section. The patient has been provided with a written plan.  Health Maintenance   Topic Date Due   • ZOSTER VACCINE (2 of 3) 11/28/2016   • INFLUENZA VACCINE  08/01/2021   • DXA SCAN  09/13/2021   • MAMMOGRAM  01/15/2022   • LIPID PANEL  11/19/2022   • TDAP/TD VACCINES (2 - Td or Tdap) 10/16/2027      Advanced Care Planning:  ACP discussion was held with the patient during this visit. Patient has an advance directive (not in EMR), copy requested.  Identification of Risk Factors:  Risk factors include: cardiovascular risk    Compared to one year ago, the patient feels her physical health is the same and her mental health is the same.  Objective     Vitals:    11/22/21 1302   BP: 140/84   Pulse: 90   Resp: 14   SpO2: 98%   Weight: 61.7  "kg (136 lb)   Height: 160 cm (63\")     Physical Exam  Patient's Body mass index is 24.09 kg/m². is within normal parameters. No follow-up required..     Assessment/Plan   Patient Self-Management and Personalized Health Advice      The patient has been provided with information about:  Advance Directive Discussion  Immunizations Discussed/Encouraged (specific immunizations; Influenza and Pneumococcal 23 )  Visit Diagnoses:  Problem List Items Addressed This Visit        Cardiac and Vasculature    Hyperlipidemia    Overview     Max 11/22/2021  Her LDL is very high. Discussed statin vs life style changes. Will repeat in six months.             Endocrine and Metabolic    Hypothyroidism    Relevant Medications    levothyroxine (SYNTHROID, LEVOTHROID) 50 MCG tablet       Mental Health    Depression with anxiety    Relevant Medications    buPROPion XL (WELLBUTRIN XL) 300 MG 24 hr tablet    citalopram (CeleXA) 20 MG tablet      Other Visit Diagnoses     Welcome to Medicare preventive visit    -  Primary    Blood pressure elevated without history of HTN        monitor BP and reduce salt.         Orders Placed This Encounter   Procedures   • Fluzone High-Dose 65+yrs ([id number])      Current medication list contains high risk medications. No harmful drug interactions have been identified.  Plan of action: Continued monitoring  Follow Up:  No follow-ups on file.   An After Visit Summary and PPPS with all of these plans were given to the patient.      The 10-year ASCVD risk score (Patrice BART Jr., et al., 2013) is: 7.4%    Values used to calculate the score:      Age: 66 years      Sex: Female      Is Non- : No      Diabetic: No      Tobacco smoker: No      Systolic Blood Pressure: 140 mmHg      Is BP treated: No      HDL Cholesterol: 69 mg/dL      Total Cholesterol: 299 mg/dL      She will come back for EKG as our machine is out of paper.                                 "

## 2021-12-03 DIAGNOSIS — E78.2 MIXED HYPERLIPIDEMIA: ICD-10-CM

## 2021-12-03 RX ORDER — OMEGA-3-ACID ETHYL ESTERS 1 G/1
1 CAPSULE, LIQUID FILLED ORAL 2 TIMES DAILY
Qty: 180 CAPSULE | Refills: 2 | Status: SHIPPED | OUTPATIENT
Start: 2021-12-03 | End: 2022-11-28 | Stop reason: SDUPTHER

## 2022-03-23 ENCOUNTER — TELEPHONE (OUTPATIENT)
Dept: FAMILY MEDICINE CLINIC | Facility: CLINIC | Age: 67
End: 2022-03-23

## 2022-03-23 DIAGNOSIS — R53.83 FATIGUE, UNSPECIFIED TYPE: ICD-10-CM

## 2022-03-23 DIAGNOSIS — E78.2 MIXED HYPERLIPIDEMIA: Primary | ICD-10-CM

## 2022-03-23 DIAGNOSIS — Z13.1 DIABETES MELLITUS SCREENING: ICD-10-CM

## 2022-03-23 DIAGNOSIS — E03.9 ACQUIRED HYPOTHYROIDISM: ICD-10-CM

## 2022-03-23 NOTE — TELEPHONE ENCOUNTER
TIMI for lab orders.     Pt called to scheduled labs prior to a 4MO f/u scheduled for 4/1/22. No lab order yet in chart but pt stated that Dr. Mclean had instructed her to schedule these in advance of her appt. Pt scheduled for 3/25/22 for labs.

## 2022-04-01 ENCOUNTER — OFFICE VISIT (OUTPATIENT)
Dept: FAMILY MEDICINE CLINIC | Facility: CLINIC | Age: 67
End: 2022-04-01

## 2022-04-01 VITALS
SYSTOLIC BLOOD PRESSURE: 140 MMHG | HEART RATE: 72 BPM | OXYGEN SATURATION: 98 % | RESPIRATION RATE: 16 BRPM | WEIGHT: 122.9 LBS | BODY MASS INDEX: 21.78 KG/M2 | DIASTOLIC BLOOD PRESSURE: 88 MMHG | HEIGHT: 63 IN

## 2022-04-01 DIAGNOSIS — R03.0 BLOOD PRESSURE ELEVATED WITHOUT HISTORY OF HTN: ICD-10-CM

## 2022-04-01 DIAGNOSIS — E78.2 MIXED HYPERLIPIDEMIA: Primary | ICD-10-CM

## 2022-04-01 DIAGNOSIS — E03.9 ACQUIRED HYPOTHYROIDISM: ICD-10-CM

## 2022-04-01 PROCEDURE — 99213 OFFICE O/P EST LOW 20 MIN: CPT | Performed by: FAMILY MEDICINE

## 2022-04-01 NOTE — PROGRESS NOTES
Assessment and Plan     Problem List Items Addressed This Visit        Cardiac and Vasculature    Hyperlipidemia - Primary    Overview     Luis A 4/1/2022 she has reduced her LDL by 50 points.  Her blood pressure remains low in other doctor's offices as well as at home   Lab Results   Component Value Date    CHLPL 245 (H) 03/25/2022     (H) 03/25/2022    HDL 60 03/25/2022    TRIG 117 03/25/2022    [facility]AN[facility] 11/22/2021  Her LDL is very high. Discussed statin vs life style changes. Will repeat in six months.               Endocrine and Metabolic    Hypothyroidism    [facility]   TSH suggests mild overcorrection.  Will monitor and recheck with next blood work.  TSH was 0.427                 Other Visit Diagnoses     Blood pressure elevated without history of HTN            No follow-ups on file.    Patient was given instructions and counseling regarding her condition or for health maintenance advice. Please see specific information pulled into the AVS if appropriate.        Ciara is a 66 y.o. being seen today for  Hypothyroidism and Hyperlipidemia   Subjective   History of the Present Illness   Reviewed labs -- good numbers. Has been checking BPs at home  And they are good, saw three other docs and they were good there too. Just here is it always elevated .   Social History  She  reports that she has never smoked. She has never used smokeless tobacco. She reports current alcohol use. She reports that she does not use drugs.  Objective   Vital Signs        BP Readings from Last 1 Encounters:   04/01/22 140/88     Wt Readings from Last 3 Encounters:   04/01/22 55.7 kg (122 lb 14.4 oz)   11/22/21 61.7 kg (136 lb)   06/15/21 62.6 kg (138 lb)   Body mass index is 21.77 kg/m².     Physical Exam  Vitals reviewed.   Constitutional:       Appearance: Normal appearance. She is well-developed and normal weight.      Comments: Mask in place    Cardiovascular:      Rate and Rhythm: Normal rate and  regular rhythm.      Heart sounds: Normal heart sounds.   Pulmonary:      Effort: Pulmonary effort is normal.      Breath sounds: Normal breath sounds.   Neurological:      Mental Status: She is alert.   Psychiatric:         Behavior: Behavior normal.         Thought Content: Thought content normal.         Judgment: Judgment normal.

## 2022-11-16 DIAGNOSIS — R53.83 FATIGUE, UNSPECIFIED TYPE: ICD-10-CM

## 2022-11-16 DIAGNOSIS — E78.2 MIXED HYPERLIPIDEMIA: Primary | ICD-10-CM

## 2022-11-16 DIAGNOSIS — E03.9 ACQUIRED HYPOTHYROIDISM: ICD-10-CM

## 2022-11-16 DIAGNOSIS — Z13.1 DIABETES MELLITUS SCREENING: ICD-10-CM

## 2022-11-23 NOTE — PROGRESS NOTES
Subsequent Medicare Wellness Visit    Chief Complaint   Patient presents with   • Annual Exam         History of Present Illness:  Ciara Alonzo is a 67 y.o. female who presents for a Subsequent Medicare Wellness Visit.    Compared to one year ago, the patient feels her physical health is better and her mental health is the same.  Recent Hospitalizations:  She was not admitted to the hospital during the last year.   Current Medical Providers:  Patient Care Team:  Stormy Mclean MD as PCP - General Regan, Mindy Workman MD as Consulting Physician (Obstetrics and Gynecology)  No opioid medication identified on active medication list. I have reviewed chart for other potential  high risk medication/s and harmful drug interactions in the elderly.  Aspirin is not on active medication list.  Aspirin use is not indicated based on review of current medical condition/s. Risk of harm outweighs potential benefits.  .  Advance Care Planning   Advance Directive is not on file.  ACP discussion was held with the patient during this visit. Patient has an advance directive (not in EMR), copy requested.     BMI Readings from Last 1 Encounters:   11/28/22 20.19 kg/m²   BMI is within normal parameters. No follow-up required.  Body mass index is 20.19 kg/m².  Does the patient have evidence of cognitive impairment? No  HEALTH RISK ASSESSMENT  Smoking Status:  Social History     Tobacco Use   Smoking Status Never   Smokeless Tobacco Never     Alcohol Consumption:  Social History     Substance and Sexual Activity   Alcohol Use Yes    Comment: SOCIAL     Fall Risk Screen:  STEADI Fall Risk Assessment was completed, and patient is at LOW risk for falls.Assessment completed on:11/28/2022  Depression Screening:  PHQ-2/PHQ-9 Depression Screening 11/28/2022   Retired PHQ-9 Total Score -   Retired Total Score -   Little Interest or Pleasure in Doing Things 0-->not at all   Feeling Down, Depressed or Hopeless 0-->not at all   PHQ-9: Brief  Depression Severity Measure Score 0     Health Habits and Functional and Cognitive Screening:  Functional & Cognitive Status 11/28/2022   Do you have difficulty preparing food and eating? No   Do you have difficulty bathing yourself, getting dressed or grooming yourself? No   Do you have difficulty using the toilet? No   Do you have difficulty moving around from place to place? No   Do you have trouble with steps or getting out of a bed or a chair? No   Current Diet Well Balanced Diet   Dental Exam Up to date   Eye Exam Up to date   Exercise (times per week) 5 times per week   Current Exercises Include Walking   Do you need help using the phone?  No   Are you deaf or do you have serious difficulty hearing?  No   Do you need help with transportation? No   Do you need help shopping? No   Do you need help preparing meals?  No   Do you need help with housework?  No   Do you need help with laundry? No   Do you need help taking your medications? No   Do you need help managing money? No   Do you ever drive or ride in a car without wearing a seat belt? No   Have you felt unusual stress, anger or loneliness in the last month? No   Who do you live with? Spouse   If you need help, do you have trouble finding someone available to you? No   Have you been bothered in the last four weeks by sexual problems? No   Do you have difficulty concentrating, remembering or making decisions? No       Health Habits  Current Diet: Well Balanced Diet  Dental Exam: Up to date  Eye Exam: Up to date  Exercise (times per week): 5 times per week  Current Exercises Include: Walking  Age-appropriate Screening Schedule:  Refer to the list below for future screening recommendations based on patient's age, sex and/or medical conditions. Orders for these recommended tests are listed in the plan section. The patient has been provided with a written plan.  Health Maintenance   Topic Date Due   • ZOSTER VACCINE (2 of 3) 11/28/2016   • DXA SCAN  09/13/2021    • MAMMOGRAM  01/27/2023   • LIPID PANEL  11/21/2023   • TDAP/TD VACCINES (2 - Td or Tdap) 10/16/2027   • INFLUENZA VACCINE  Completed        CMS Preventative Services Quick Reference  Risk Factors Identified During Encounter  None Identified  The above risks/problems have been discussed with the patient.  Follow up actions/plans if indicated are seen below in the Assessment/Plan Section.  Pertinent information has been shared with the patient in the After Visit Summary.  Patient Instructions        Follow Up:   Return in about 1 year (around 11/28/2023) for yearly Medicare Exam, lab with next visit, Recheck.   An After Visit Summary and PPPS were given/sent to the patient.    Patient has multiple medical problems which are significant and separately identifiable that require additional work above and beyond the Medicare Wellness Visit. These are not trivial or insignificant and are billed with a 25-modifier  Problem Visit:  Ciara is a 67 y.o. also being seen today for hypothyroidism and hyperlipidemia   Subjective   History of the Present Illness   No complaints referable to a thyroid condition. She continues to take fish oil for her triglycerides.   Social History  She  reports that she has never smoked. She has never used smokeless tobacco. She reports current alcohol use. She reports that she does not use drugs.  Objective   Vital Signs        BP Readings from Last 1 Encounters:   11/28/22 130/82     Wt Readings from Last 3 Encounters:   11/28/22 51.7 kg (114 lb)   04/01/22 55.7 kg (122 lb 14.4 oz)   11/22/21 61.7 kg (136 lb)   Body mass index is 20.19 kg/m².     Physical Exam  Vitals reviewed.   Constitutional:       Appearance: Normal appearance. She is well-developed and normal weight.      Comments: Mask in place    Cardiovascular:      Rate and Rhythm: Normal rate and regular rhythm.      Heart sounds: Normal heart sounds.   Pulmonary:      Effort: Pulmonary effort is normal.      Breath sounds: Normal  breath sounds.   Neurological:      Mental Status: She is alert.   Psychiatric:         Behavior: Behavior normal.         Thought Content: Thought content normal.         Judgment: Judgment normal.     Lipid Panel With / Chol / HDL Ratio (11/21/2022 08:26)  Comprehensive Metabolic Panel (11/21/2022 08:26)  CBC & Differential (11/21/2022 08:26)  TSH (11/21/2022 08:26)        Assessment and Plan       Problem List Items Addressed This Visit        Cardiac and Vasculature    Hyperlipidemia    Overview     [facility] 11/28/2022 Continue fish oil  Lab Results   Component Value Date    CHLPL 236 (H) 11/21/2022     (H) 11/21/2022    HDL 75 (H) 11/21/2022    TRIG 104 11/21/2022    [facility] 11/22/2021  Her LDL is very high. Discussed statin vs life style changes. Will repeat in six months.          Relevant Medications    omega-3 acid ethyl esters (LOVAZA) 1 g capsule       Endocrine and Metabolic    Hypothyroidism    Overview     [facility] 11/28/2022   TSH normal.  1.430 .                Relevant Medications    levothyroxine (SYNTHROID, LEVOTHROID) 50 MCG tablet       Mental Health    Depression with anxiety    Overview     [facility] 11/28/2022    Doing well with medications. Wishes to continue same         Relevant Medications    citalopram (CeleXA) 20 MG tablet    buPROPion XL (WELLBUTRIN XL) 300 MG 24 hr tablet   Other Visit Diagnoses     Medicare annual wellness visit, subsequent    -  Primary               Patient was given instructions and counseling regarding her condition or for health maintenance advice. Please see specific information pulled into the AVS if appropriate.

## 2022-11-28 ENCOUNTER — OFFICE VISIT (OUTPATIENT)
Dept: FAMILY MEDICINE CLINIC | Facility: CLINIC | Age: 67
End: 2022-11-28

## 2022-11-28 VITALS
RESPIRATION RATE: 16 BRPM | HEIGHT: 63 IN | HEART RATE: 76 BPM | WEIGHT: 114 LBS | OXYGEN SATURATION: 97 % | SYSTOLIC BLOOD PRESSURE: 130 MMHG | BODY MASS INDEX: 20.2 KG/M2 | DIASTOLIC BLOOD PRESSURE: 82 MMHG

## 2022-11-28 DIAGNOSIS — F41.8 DEPRESSION WITH ANXIETY: ICD-10-CM

## 2022-11-28 DIAGNOSIS — E03.9 ACQUIRED HYPOTHYROIDISM: ICD-10-CM

## 2022-11-28 DIAGNOSIS — E78.2 MIXED HYPERLIPIDEMIA: ICD-10-CM

## 2022-11-28 DIAGNOSIS — Z00.00 MEDICARE ANNUAL WELLNESS VISIT, SUBSEQUENT: Primary | ICD-10-CM

## 2022-11-28 PROCEDURE — 0124A PR ADM SARSCOV2 30MCG/0.3ML BST: CPT | Performed by: FAMILY MEDICINE

## 2022-11-28 PROCEDURE — 90662 IIV NO PRSV INCREASED AG IM: CPT | Performed by: FAMILY MEDICINE

## 2022-11-28 PROCEDURE — 1170F FXNL STATUS ASSESSED: CPT | Performed by: FAMILY MEDICINE

## 2022-11-28 PROCEDURE — 91312 COVID-19 (PFIZER) BIVALENT BOOSTER 12+YRS: CPT | Performed by: FAMILY MEDICINE

## 2022-11-28 PROCEDURE — G0439 PPPS, SUBSEQ VISIT: HCPCS | Performed by: FAMILY MEDICINE

## 2022-11-28 PROCEDURE — G0008 ADMIN INFLUENZA VIRUS VAC: HCPCS | Performed by: FAMILY MEDICINE

## 2022-11-28 PROCEDURE — 96160 PT-FOCUSED HLTH RISK ASSMT: CPT | Performed by: FAMILY MEDICINE

## 2022-11-28 PROCEDURE — 1160F RVW MEDS BY RX/DR IN RCRD: CPT | Performed by: FAMILY MEDICINE

## 2022-11-28 PROCEDURE — 99213 OFFICE O/P EST LOW 20 MIN: CPT | Performed by: FAMILY MEDICINE

## 2022-11-28 RX ORDER — LEVOTHYROXINE SODIUM 0.05 MG/1
50 TABLET ORAL DAILY
Qty: 90 TABLET | Refills: 3 | Status: SHIPPED | OUTPATIENT
Start: 2022-11-28

## 2022-11-28 RX ORDER — BUPROPION HYDROCHLORIDE 300 MG/1
300 TABLET ORAL DAILY
Qty: 90 TABLET | Refills: 3 | Status: SHIPPED | OUTPATIENT
Start: 2022-11-28

## 2022-11-28 RX ORDER — CITALOPRAM 20 MG/1
10 TABLET ORAL DAILY
Qty: 90 TABLET | Refills: 3 | Status: SHIPPED | OUTPATIENT
Start: 2022-11-28

## 2022-11-28 RX ORDER — OMEGA-3-ACID ETHYL ESTERS 1 G/1
1 CAPSULE, LIQUID FILLED ORAL 2 TIMES DAILY
Qty: 180 CAPSULE | Refills: 2 | Status: SHIPPED | OUTPATIENT
Start: 2022-11-28

## 2023-11-28 NOTE — ASSESSMENT & PLAN NOTE
Lipid abnormalities are improving with lifestyle modifications.  She increased her omega-3 and that seems to be helping  Lipids will be reassessed in 1 year.

## 2023-11-28 NOTE — PROGRESS NOTES
"Subsequent Medicare Wellness Visit     Ciara Alonzo is a 68 y.o. female who presents for a Medicare Wellness Visit.    Compared to one year ago, the patient feels her physical health is better and her mental health is the same.  Recent Hospitalizations:  She was not admitted to the hospital during the last year.     Current Medical Providers:  Patient Care Team:  Stormy Mclean MD as PCP - General [facility]an, Mindy Workman MD as Consulting Physician (Obstetrics and Gynecology)  No opioid medication identified on active medication list. I have reviewed chart for other potential  high risk medication/s and harmful drug interactions in the elderly.  Aspirin is not on active medication list.  Aspirin use is not indicated based on review of current medical condition/s. Risk of harm outweighs potential benefits.  .  Advance Care Planning   Advance Directive is not on file.  ACP discussion was held with the patient during this visit. Patient has an advance directive (not in EMR), copy requested.     Estimated body mass index is 23.13 kg/m² as calculated from the following:    Height as of this encounter: 160 cm (63\").    Weight as of this encounter: 59.2 kg (130 lb 9.6 oz).  BMI is within normal parameters. No other follow-up for BMI required.  Does the patient have evidence of cognitive impairment? No  HEALTH RISK ASSESSMENT  Smoking Status:  Social History     Tobacco Use   Smoking Status Never   Smokeless Tobacco Never     Alcohol Consumption:  Social History     Substance and Sexual Activity   Alcohol Use Yes    Comment: SOCIAL     Fall Risk Screen:  STEADI Fall Risk Assessment was completed, and patient is at LOW risk for falls.Assessment completed on:2023  Depression Screenin/1/2023    10:52 AM   PHQ-2/PHQ-9 Depression Screening   Little Interest or Pleasure in Doing Things 0-->not at all   Feeling Down, Depressed or Hopeless 0-->not at all   PHQ-9: Brief Depression Severity Measure Score 0     Health " Habits and Functional and Cognitive Screenin/1/2023    10:55 AM   Functional & Cognitive Status   Do you have difficulty preparing food and eating? No   Do you have difficulty bathing yourself, getting dressed or grooming yourself? No   Do you have difficulty using the toilet? No   Do you have difficulty moving around from place to place? No   Do you have trouble with steps or getting out of a bed or a chair? No   Current Diet Well Balanced Diet   Dental Exam Up to date   Eye Exam Up to date   Exercise (times per week) 5 times per week   Current Exercises Include Walking;Pilates   Do you need help using the phone?  No   Are you deaf or do you have serious difficulty hearing?  No   Do you need help to go to places out of walking distance? No   Do you need help shopping? No   Do you need help preparing meals?  No   Do you need help with housework?  No   Do you need help with laundry? No   Do you need help taking your medications? No   Do you need help managing money? No   Do you ever drive or ride in a car without wearing a seat belt? No   Have you felt unusual stress, anger or loneliness in the last month? No   Who do you live with? Child   If you need help, do you have trouble finding someone available to you? No   Have you been bothered in the last four weeks by sexual problems? No   Do you have difficulty concentrating, remembering or making decisions? No       Health Habits  Current Diet: Well Balanced Diet  Dental Exam: Up to date  Eye Exam: Up to date  Exercise (times per week): 5 times per week  Current Exercises Include: Walking, Pilates  Age-appropriate Screening Schedule:  Refer to the list below for future screening recommendations based on patient's age, sex and/or medical conditions. Orders for these recommended tests are listed in the plan section. The patient has been provided with a written plan.  Health Maintenance   Topic Date Due    MAMMOGRAM  2024    LIPID PANEL  2024    DXA  SCAN  11/21/2024    ANNUAL WELLNESS VISIT  12/01/2024    TDAP/TD VACCINES (2 - Td or Tdap) 10/16/2027    COLORECTAL CANCER SCREENING  06/15/2031    HEPATITIS C SCREENING  Completed    COVID-19 Vaccine  Completed    INFLUENZA VACCINE  Completed    Pneumococcal Vaccine 65+  Completed    ZOSTER VACCINE  Completed        CMS Preventative Services Quick Reference  Risk Factors Identified During Encounter  None Identified  The above risks/problems have been discussed with the patient.  Follow up actions/plans if indicated are seen below in the Assessment/Plan Section.  Pertinent information has been shared with the patient in the After Visit Summary.    Follow Up: Medicare visit in one year  An After Visit Summary and PPPS were given/sent to the patient.    Patient has multiple medical problems which are significant and separately identifiable that require additional work above and beyond the Medicare Wellness Visit. These are not trivial or insignificant and are billed with a 25-modifier  Problem Visit:  Ciara is a 68 y.o. also being seen today for hyperlipidemia,    Subjective   History of the Present Illness   Patient is stable on her mental health and lipid medications. Will remain on same.   Social History  She  reports that she has never smoked. She has never used smokeless tobacco. She reports current alcohol use. She reports that she does not use drugs.  Objective   Vital Signs        BP Readings from Last 1 Encounters:   12/01/23 128/80     Wt Readings from Last 3 Encounters:   12/01/23 59.2 kg (130 lb 9.6 oz)   11/28/22 51.7 kg (114 lb)   04/01/22 55.7 kg (122 lb 14.4 oz)   Body mass index is 23.13 kg/m².     Physical Exam  Vitals reviewed.   Constitutional:       Appearance: Normal appearance. She is well-developed.   Cardiovascular:      Rate and Rhythm: Normal rate and regular rhythm.      Heart sounds: Normal heart sounds.   Pulmonary:      Effort: Pulmonary effort is normal.      Breath sounds: Normal  breath sounds.   Neurological:      Mental Status: She is alert.   Psychiatric:         Behavior: Behavior normal.         Thought Content: Thought content normal.         Judgment: Judgment normal.     Lipid Panel With / Chol / HDL Ratio (11/20/2023 07:52)  Comprehensive Metabolic Panel (11/20/2023 07:52)  CBC & Differential (11/20/2023 07:52)  TSH (11/20/2023 07:52)      Assessment and Plan       Assessment & Plan  Mixed hyperlipidemia  Lipid abnormalities are improving with lifestyle modifications.  She increased her omega-3 and that seems to be helping  Lipids will be reassessed in 1 year.  Acquired hypothyroidism  Labs stable  Need for vaccination    Depression with anxiety   Doing well with medications. Wishes to continue same  The 10-year ASCVD risk score (Mery DK, et al., 2019) is: 6.7%    Values used to calculate the score:      Age: 68 years      Sex: Female      Is Non- : No      Diabetic: No      Tobacco smoker: No      Systolic Blood Pressure: 128 mmHg      Is BP treated: No      HDL Cholesterol: 69 mg/dL      Total Cholesterol: 219 mg/dL         Patient was given instructions and counseling regarding her condition or for health maintenance advice. Please see specific information pulled into the AVS if appropriate.

## 2023-12-01 ENCOUNTER — OFFICE VISIT (OUTPATIENT)
Dept: FAMILY MEDICINE CLINIC | Facility: CLINIC | Age: 68
End: 2023-12-01
Payer: MEDICARE

## 2023-12-01 VITALS
BODY MASS INDEX: 23.14 KG/M2 | RESPIRATION RATE: 16 BRPM | DIASTOLIC BLOOD PRESSURE: 80 MMHG | HEIGHT: 63 IN | SYSTOLIC BLOOD PRESSURE: 128 MMHG | HEART RATE: 74 BPM | OXYGEN SATURATION: 98 % | WEIGHT: 130.6 LBS

## 2023-12-01 DIAGNOSIS — F41.8 DEPRESSION WITH ANXIETY: ICD-10-CM

## 2023-12-01 DIAGNOSIS — Z23 NEED FOR VACCINATION: ICD-10-CM

## 2023-12-01 DIAGNOSIS — E03.9 ACQUIRED HYPOTHYROIDISM: ICD-10-CM

## 2023-12-01 DIAGNOSIS — E78.2 MIXED HYPERLIPIDEMIA: Primary | ICD-10-CM

## 2023-12-01 RX ORDER — CITALOPRAM 20 MG/1
10 TABLET ORAL DAILY
Qty: 90 TABLET | Refills: 3 | Status: SHIPPED | OUTPATIENT
Start: 2023-12-01

## 2023-12-01 RX ORDER — OMEGA-3-ACID ETHYL ESTERS 1 G/1
1 CAPSULE, LIQUID FILLED ORAL 2 TIMES DAILY
Qty: 180 CAPSULE | Refills: 3 | Status: SHIPPED | OUTPATIENT
Start: 2023-12-01

## 2023-12-01 RX ORDER — BUPROPION HYDROCHLORIDE 300 MG/1
300 TABLET ORAL DAILY
Qty: 90 TABLET | Refills: 3 | Status: SHIPPED | OUTPATIENT
Start: 2023-12-01

## 2023-12-01 RX ORDER — LEVOTHYROXINE SODIUM 0.05 MG/1
50 TABLET ORAL DAILY
Qty: 90 TABLET | Refills: 3 | Status: SHIPPED | OUTPATIENT
Start: 2023-12-01

## 2024-01-22 ENCOUNTER — OFFICE VISIT (OUTPATIENT)
Dept: INTERNAL MEDICINE | Facility: CLINIC | Age: 69
End: 2024-01-22
Payer: MEDICARE

## 2024-01-22 VITALS
HEART RATE: 71 BPM | HEIGHT: 63 IN | OXYGEN SATURATION: 98 % | DIASTOLIC BLOOD PRESSURE: 80 MMHG | SYSTOLIC BLOOD PRESSURE: 122 MMHG | BODY MASS INDEX: 22.86 KG/M2 | WEIGHT: 129 LBS

## 2024-01-22 DIAGNOSIS — E78.2 MIXED HYPERLIPIDEMIA: Primary | ICD-10-CM

## 2024-01-22 DIAGNOSIS — M81.0 AGE-RELATED OSTEOPOROSIS WITHOUT CURRENT PATHOLOGICAL FRACTURE: ICD-10-CM

## 2024-01-22 RX ORDER — ATORVASTATIN CALCIUM 20 MG/1
20 TABLET, FILM COATED ORAL DAILY
Qty: 90 TABLET | Refills: 1 | Status: SHIPPED | OUTPATIENT
Start: 2024-01-22

## 2024-01-22 NOTE — PROGRESS NOTES
Shreyas Menjivar D.O.  Internal Medicine  [facility]  [address], [address]  [place] [address]  [phone]      Chief Complaint  Establish Care (Former pcp Dr Rodríguez)  Hyperlipidemia     SUBJECTIVE    History of Present Illness    Ciara Alonzo is a 68 y.o. female who presents to the office today as an established patient that last saw me on Visit date not found.     Hyperlipidemia: takes Lovaza 2 pills daily. States exercise is down but previously did some yoga, elliptical, pilates.     Anxiety and depression: takes citalopram 10 mg daily and bupropion  mg daily. States she has a good life and she can talk herself out of situations where symptoms are worse.  Previously saw a therapist.     Hypothyroidism: takes levothyroxine 50 mcg daily . States she has had partial thyroidectomy around age 30 for a mass that was noncancerous.   Lab Results   Component Value Date    TSH 1.430 11/20/2023       right breast conserving surgical procedure for a stage I (T1CN0) ductal carcinoma: Letrozole initiated September 2017. XRT completed 10/26/17. No chemotherapy.  Follows with [facility]. Continues on letrozole.     Osteoporosis: previously on denosumab and currently on reclast . Managed by [facility]. One fragility fracture in 2020 right hand.     No Known Allergies     Outpatient Medications Marked as Taking for the 1/22/24 encounter (Office Visit) with Shreyas Menjivar, DO   Medication Sig Dispense Refill    buPROPion XL (WELLBUTRIN XL) 300 MG 24 hr tablet Take 1 tablet by mouth Daily. 90 tablet 3    citalopram (CeleXA) 20 MG tablet Take 0.5 tablets by mouth Daily. 90 tablet 3    letrozole (FEMARA) 2.5 MG tablet Take 1 tablet by mouth Daily.      levothyroxine (SYNTHROID, LEVOTHROID) 50 MCG tablet Take 1 tablet by mouth Daily. 90 tablet 3    omega-3 acid ethyl esters (LOVAZA) 1 g capsule Take 1 capsule by mouth 2 (Two) Times a Day. 180 capsule 3        Past Medical History:  "  Diagnosis Date    Anxiety and depression     Arthritis     left knee    Breast cancer     right breast    Cervical cancer     s/p conization and hysterectomy    Hyperlipidemia     Hypothyroid     after partial thyroidectomy at age 30.    Metacarpal bone fracture     Osteoporosis     PONV (postoperative nausea and vomiting)     Rotator cuff injury 2014    completed physical therapy     Past Surgical History:   Procedure Laterality Date    BREAST LUMPECTOMY WITH SENTINEL NODE BIOPSY Right 07/27/2017    CERVICAL CONIZATION      COLONOSCOPY N/A 01/30/2018    Procedure: COLONOSCOPY TO CECUM AND INTO TERMINAL ILEUM WITH COLD BIOPSY POLYPECTOMY;  Surgeon: Mee Hou MD;  Location:  [facility];  Service:     COLONOSCOPY N/A 06/15/2021    Procedure: COLONOSCOPY to cecum and ti with cold bx polypectomy;  Surgeon: Mee Hou MD;  Location:  [facility];  Service: Gastroenterology;  Laterality: N/A;  PRE: Hx of Colon Polyps  POST: Polyp, Diverticuosis, Hemorrhoids    DILATION AND CURETTAGE, DIAGNOSTIC / THERAPEUTIC      HYSTERECTOMY      THYROIDECTOMY, PARTIAL      WRIST SURGERY Right 2020    Surgery in June and December     Family History   Problem Relation Age of Onset    Hypertension Mother     Dementia Mother     Stroke Father 81    Stroke Brother 69    Other Brother         accidental death    Heart attack Brother     Malarjun Hyperthermia Neg Hx     reports that she has never smoked. She has never used smokeless tobacco. She reports that she does not currently use alcohol. She reports current drug use. Drug: Marijuana.    OBJECTIVE    Vital Signs:   /80   Pulse 71   Ht 160 cm (63\")   Wt 58.5 kg (129 lb)   SpO2 98%   BMI 22.85 kg/m²     Physical Exam  Vitals reviewed.   Constitutional:       General: She is not in acute distress.     Appearance: Normal appearance. She is normal weight. She is not ill-appearing.   HENT:      Right Ear: Tympanic membrane, ear canal and external ear normal. There " is no impacted cerumen.      Left Ear: Tympanic membrane, ear canal and external ear normal. There is no impacted cerumen.   Eyes:      General: No scleral icterus.  Cardiovascular:      Rate and Rhythm: Normal rate and regular rhythm.      Heart sounds: Normal heart sounds. No murmur heard.  Pulmonary:      Effort: Pulmonary effort is normal. No respiratory distress.      Breath sounds: Normal breath sounds. No stridor. No wheezing or rhonchi.   Musculoskeletal:      Right lower leg: No edema.   Skin:     Coloration: Skin is not jaundiced.   Neurological:      Mental Status: She is alert.   Psychiatric:         Mood and Affect: Mood normal.         Behavior: Behavior normal.         Thought Content: Thought content normal.                             ASSESSMENT & PLAN     Diagnoses and all orders for this visit:    1. Mixed hyperlipidemia (Primary)  -takes Lovaza 2 pills daily. States exercise is down but previously did some yoga, elliptical, pilates.   Lab Results   Component Value Date    CHLPL 219 (H) 11/20/2023    TRIG 73 11/20/2023    HDL 69 (H) 11/20/2023     (H) 11/20/2023     -advised pt that Lovaza does not work on LDL and she still has high LDL after years of dietary modification.   -Discussed with patient the significance of his elevated ASCVD risk and LDL cholesterol. Introduced statin based therapy. Informed pt that statins are associated with rare risk of increased liver enzymes as well as severe allergy. More common side effects include muscle aches and pains that usually go away within several weeks of therapy. She was agreeable to treatment. Will start atorvastatin 20 mg daily and have her return in 3 months for fasting recheck.  -     atorvastatin (LIPITOR) 20 MG tablet; Take 1 tablet by mouth Daily.  Dispense: 90 tablet; Refill: 1    2. Age-related osteoporosis without current pathological fracture  -previously on denosumab and currently on reclast . Managed by [facility]. One  fragility fracture in 2020 right hand.   -I reviewed most recent [facility] note  Lab Results   Component Value Date    GLUCOSE 90 11/20/2023    BUN 13 11/20/2023    CREATININE 0.78 11/20/2023    EGFRRESULT 83.4 11/20/2023    BCR 16.7 11/20/2023    K 4.9 11/20/2023    CO2 27.4 11/20/2023    CALCIUM 9.5 11/20/2023    PROTENTOTREF 6.6 11/20/2023    ALBUMIN 4.8 11/20/2023    BILITOT 0.2 11/20/2023    AST 15 11/20/2023    ALT 12 11/20/2023     -CMP shows normal calcium . Will obtain vitamin D check today. Recommend she begin calcium and vitamin D supplement OTC and get back into exercising.   -     Vitamin D 25 hydroxy            The following social determinates of health impact the patient's medical decision making: No social determinates of health were factored in to today's visit.     Follow Up  Return in about 3 months (around 4/22/2024) for Recheck.    Patient/family had no further questions at this time and verbalized understanding of the plan discussed today.

## 2024-01-23 ENCOUNTER — PATIENT ROUNDING (BHMG ONLY) (OUTPATIENT)
Dept: INTERNAL MEDICINE | Facility: CLINIC | Age: 69
End: 2024-01-23
Payer: MEDICARE

## 2024-01-23 LAB — 25(OH)D3+25(OH)D2 SERPL-MCNC: 42.8 NG/ML (ref 30–100)

## 2024-01-23 NOTE — PROGRESS NOTES
January 23, 2024    Rounded with & welcomed patient during rooming, check in/out.  Patient will follow up at next scheduled office visit.

## 2024-02-22 DIAGNOSIS — F41.8 DEPRESSION WITH ANXIETY: ICD-10-CM

## 2024-02-22 DIAGNOSIS — E78.2 MIXED HYPERLIPIDEMIA: ICD-10-CM

## 2024-02-22 DIAGNOSIS — E03.9 ACQUIRED HYPOTHYROIDISM: ICD-10-CM

## 2024-02-22 RX ORDER — OMEGA-3-ACID ETHYL ESTERS 1 G/1
1 CAPSULE, LIQUID FILLED ORAL 2 TIMES DAILY
Qty: 180 CAPSULE | Refills: 3 | Status: SHIPPED | OUTPATIENT
Start: 2024-02-22

## 2024-02-22 RX ORDER — BUPROPION HYDROCHLORIDE 300 MG/1
300 TABLET ORAL DAILY
Qty: 90 TABLET | Refills: 3 | Status: SHIPPED | OUTPATIENT
Start: 2024-02-22

## 2024-02-22 RX ORDER — LEVOTHYROXINE SODIUM 0.05 MG/1
50 TABLET ORAL DAILY
Qty: 90 TABLET | Refills: 3 | Status: SHIPPED | OUTPATIENT
Start: 2024-02-22

## 2024-02-22 RX ORDER — CITALOPRAM HYDROBROMIDE 10 MG/1
10 TABLET ORAL DAILY
Qty: 180 TABLET | Refills: 1 | Status: SHIPPED | OUTPATIENT
Start: 2024-02-22

## 2024-04-22 ENCOUNTER — OFFICE VISIT (OUTPATIENT)
Dept: INTERNAL MEDICINE | Facility: CLINIC | Age: 69
End: 2024-04-22
Payer: MEDICARE

## 2024-04-22 VITALS
HEIGHT: 63 IN | HEART RATE: 84 BPM | WEIGHT: 133 LBS | SYSTOLIC BLOOD PRESSURE: 130 MMHG | OXYGEN SATURATION: 97 % | DIASTOLIC BLOOD PRESSURE: 82 MMHG | BODY MASS INDEX: 23.57 KG/M2

## 2024-04-22 DIAGNOSIS — E78.2 MIXED HYPERLIPIDEMIA: Primary | ICD-10-CM

## 2024-04-22 LAB
CHOLEST SERPL-MCNC: 155 MG/DL (ref 0–200)
HDLC SERPL-MCNC: 64 MG/DL (ref 40–60)
LDLC SERPL CALC-MCNC: 73 MG/DL (ref 0–100)
TRIGL SERPL-MCNC: 98 MG/DL (ref 0–150)
VLDLC SERPL CALC-MCNC: 18 MG/DL (ref 5–40)

## 2024-04-22 PROCEDURE — G2211 COMPLEX E/M VISIT ADD ON: HCPCS | Performed by: STUDENT IN AN ORGANIZED HEALTH CARE EDUCATION/TRAINING PROGRAM

## 2024-04-22 PROCEDURE — 99214 OFFICE O/P EST MOD 30 MIN: CPT | Performed by: STUDENT IN AN ORGANIZED HEALTH CARE EDUCATION/TRAINING PROGRAM

## 2024-04-22 NOTE — PROGRESS NOTES
"  Shreyas Menjivar D.O.  Internal Medicine  [facility]  [address], [address]  [place] [address]  [phone]      Chief Complaint  Hyperlipidemia    SUBJECTIVE    History of Present Illness    Ciara Alonzo is a 68 y.o. female who presents to the office today as an established patient that last saw me on 1/22/2024.     Hyperlipidemia: takes Lovaza 2 pills daily and at last visit started atorvastatin 20 mg daily for primary prevention. No medication side effects.  States exercises about 20 minutes daily. Feels that diet is \"moderate\" and could increase fruits and vegetables.      No Known Allergies     Outpatient Medications Marked as Taking for the 4/22/24 encounter (Office Visit) with Shreyas Menjivar, DO   Medication Sig Dispense Refill    atorvastatin (LIPITOR) 20 MG tablet Take 1 tablet by mouth Daily. 90 tablet 1    buPROPion XL (WELLBUTRIN XL) 300 MG 24 hr tablet TAKE 1 TABLET BY MOUTH DAILY 90 tablet 3    citalopram (CeleXA) 10 MG tablet TAKE 1 TABLET BY MOUTH EVERY  tablet 1    letrozole (FEMARA) 2.5 MG tablet Take 1 tablet by mouth Daily.      levothyroxine (SYNTHROID, LEVOTHROID) 50 MCG tablet TAKE 1 TABLET BY MOUTH DAILY 90 tablet 3    omega-3 acid ethyl esters (LOVAZA) 1 g capsule TAKE 1 CAPSULE BY MOUTH TWICE DAILY 180 capsule 3        Past Medical History:   Diagnosis Date    Anxiety and depression     Arthritis     left knee    Breast cancer     right breast    Cervical cancer     s/p conization and hysterectomy    Hyperlipidemia     Hypothyroid     after partial thyroidectomy at age 30.    Metacarpal bone fracture     Osteoporosis     PONV (postoperative nausea and vomiting)     Rotator cuff injury 2014    completed physical therapy       OBJECTIVE    Vital Signs:   /82   Pulse 84   Ht 160 cm (63\")   Wt 60.3 kg (133 lb)   SpO2 97%   BMI 23.56 kg/m²        Physical Exam  Vitals reviewed.   Constitutional:       General: She is not in acute distress.     " "Appearance: Normal appearance. She is normal weight. She is not ill-appearing.   Eyes:      General: No scleral icterus.  Skin:     Coloration: Skin is not jaundiced.   Neurological:      Mental Status: She is alert.   Psychiatric:         Mood and Affect: Mood normal.         Thought Content: Thought content normal.                             ASSESSMENT & PLAN     Diagnoses and all orders for this visit:    1. Mixed hyperlipidemia (Primary)  Lab Results   Component Value Date    CHLPL 219 (H) 11/20/2023    TRIG 73 11/20/2023    HDL 69 (H) 11/20/2023     (H) 11/20/2023     -chronic, uncontrolled  -takes Lovaza 2 pills daily and at last visit started atorvastatin 20 mg daily for primary prevention. No medication side effects.  States exercises about 20 minutes daily. Feels that diet is \"moderate\" and could increase fruits and vegetables.    -recheck fasting lipid today, adjust regimen as needed  -     Lipid Panel            Follow Up  Return in about 8 months (around 12/22/2024) for Medicare Wellness.    Patient/family had no further questions at this time and verbalized understanding of the plan discussed today.   "

## 2024-07-21 DIAGNOSIS — E78.2 MIXED HYPERLIPIDEMIA: ICD-10-CM

## 2024-07-22 RX ORDER — ATORVASTATIN CALCIUM 20 MG/1
20 TABLET, FILM COATED ORAL DAILY
Qty: 90 TABLET | Refills: 1 | Status: SHIPPED | OUTPATIENT
Start: 2024-07-22

## 2024-07-25 ENCOUNTER — HOSPITAL ENCOUNTER (EMERGENCY)
Facility: HOSPITAL | Age: 69
Discharge: HOME | End: 2024-07-25
Attending: EMERGENCY MEDICINE
Payer: MEDICARE

## 2024-07-25 VITALS
RESPIRATION RATE: 16 BRPM | SYSTOLIC BLOOD PRESSURE: 147 MMHG | TEMPERATURE: 97.5 F | OXYGEN SATURATION: 99 % | DIASTOLIC BLOOD PRESSURE: 89 MMHG | HEART RATE: 85 BPM

## 2024-07-25 DIAGNOSIS — S61.451A CAT BITE OF RIGHT HAND, INITIAL ENCOUNTER: Primary | ICD-10-CM
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
## 2024-07-25 PROCEDURE — 99282 EMERGENCY DEPT VISIT SF MDM: CPT

## 2024-07-25 RX ORDER — AMOXICILLIN AND CLAVULANATE POTASSIUM 875; 125 MG/1; MG/1
1 TABLET, FILM COATED ORAL 2 TIMES DAILY
Qty: 20 TABLET | Refills: 0 | Status: SHIPPED | OUTPATIENT
Start: 2024-07-25 | End: 2024-08-04

## 2024-07-25 NOTE — ED PROVIDER NOTES
EMERGENCY DEPARTMENT ENCOUNTER  Room Number:  S03/03  PCP: Shreyas Menjivar DO  Independent Historians: Patient      HPI:  Chief Complaint: had concerns including Animal Bite.       A complete HPI/ROS/PMH/PSH/SH/FH are unobtainable due to: None    Chronic or social conditions impacting patient care (Social Determinants of Health): None      Context: Ciara Alonzo is a 69 y.o. female with a medical history of migraine, hyperlipidemia, hypothyroidism who presents to the ED c/o acute redness and swelling to her right hand after suffering a cat bite last night.  She states that it was her own domestic cat, the animal is reportedly up-to-date on all of his vaccinations including rabies.  She applied some antibiotic ointment.  Awoke this morning with some redness and swelling and pain.  She denies any fevers.  She is not diabetic, does not take any immunosuppressive medications.      Review of prior external notes (non-ED) -and- Review of prior external test results outside of this encounter:  Immunization record reviewed.  Patient received Tdap 10/16/2017, she is up-to-date.        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/2016    Depression with anxiety 02/04/2016    Hyperlipidemia 02/04/2016    Hypothyroidism 02/04/2016    Osteoporosis 02/04/2016    History of right breast cancer 07/24/2017    Aromatase inhibitor use 09/14/2020     Resolved Ambulatory Problems     Diagnosis Date Noted    Personal history of colonic polyps 04/22/2021     Past Medical History:   Diagnosis Date    Anxiety and depression     Arthritis     Breast cancer     Cervical cancer     Hypothyroid     Metacarpal bone fracture     PONV (postoperative nausea and vomiting)     Rotator cuff injury 2014         PAST SURGICAL HISTORY  Past Surgical History:   Procedure Laterality Date    BREAST LUMPECTOMY WITH SENTINEL NODE BIOPSY Right 07/27/2017    CERVICAL CONIZATION      COLONOSCOPY N/A  01/30/2018    Procedure: COLONOSCOPY TO CECUM AND INTO TERMINAL ILEUM WITH COLD BIOPSY POLYPECTOMY;  Surgeon: Mee Hou MD;  Location:  [facility];  Service:     COLONOSCOPY N/A 06/15/2021    Procedure: COLONOSCOPY to cecum and ti with cold bx polypectomy;  Surgeon: Mee Hou MD;  Location:  [facility];  Service: Gastroenterology;  Laterality: N/A;  PRE: Hx of Colon Polyps  POST: Polyp, Diverticuosis, Hemorrhoids    DILATION AND CURETTAGE, DIAGNOSTIC / THERAPEUTIC      HYSTERECTOMY      THYROIDECTOMY, PARTIAL      WRIST SURGERY Right 2020    Surgery in June and December         FAMILY HISTORY  Family History   Problem Relation Age of Onset    Hypertension Mother     Dementia Mother     Stroke Father 81    Stroke Brother 69    Other Brother         accidental death    Heart attack Brother     Malig Hyperthermia Neg Hx          SOCIAL HISTORY  Social History     Socioeconomic History    Marital status:    Tobacco Use    Smoking status: Never    Smokeless tobacco: Never   Substance and Sexual Activity    Alcohol use: Not Currently    Drug use: Yes     Types: Marijuana    Sexual activity: Not Currently     Partners: Male         ALLERGIES  Patient has no known allergies.      REVIEW OF SYSTEMS  Review of Systems  Included in HPI  All systems reviewed and negative except for those discussed in HPI.      PHYSICAL EXAM    I have reviewed the triage vital signs and nursing notes.    ED Triage Vitals [07/25/24 1215]   Temp Heart Rate Resp BP SpO2   97.5 °F (36.4 °C) 94 16 135/91 99 %      Temp src Heart Rate Source Patient Position BP Location FiO2 (%)   Tympanic -- -- -- --       Physical Exam  GENERAL: alert, no acute distress  SKIN: Warm, dry  HENT: Normocephalic, atraumatic  EYES: no scleral icterus  CV: regular rhythm, regular rate  RESPIRATORY: normal effort, lungs clear  ABDOMEN: nondistended  MUSCULOSKELETAL: no deformity.  On inspection of the right hand there is a scabbed puncture  wound on the dorsal aspect between the first and second metacarpals.  There is some edema and mild erythema in this area, no drainage, no lymphangitis.  She has full range of motion of the hand and wrist without pain.  Sensation and motor function intact distally  NEURO: alert, moves all extremities, follows commands            LAB RESULTS  No results found for this or any previous visit (from the past 24 hour(s)).      RADIOLOGY  No Radiology Exams Resulted Within Past 24 Hours      MEDICATIONS GIVEN IN ER  Medications - No data to display      ORDERS PLACED DURING THIS VISIT:  No orders of the defined types were placed in this encounter.        OUTPATIENT MEDICATION MANAGEMENT:  No current Epic-ordered facility-administered medications on file.     Current Outpatient Medications Ordered in Epic   Medication Sig Dispense Refill    amoxicillin-clavulanate (AUGMENTIN) 875-125 MG per tablet Take 1 tablet by mouth 2 (Two) Times a Day for 10 days. 20 tablet 0    atorvastatin (LIPITOR) 20 MG tablet TAKE 1 TABLET BY MOUTH DAILY 90 tablet 1    buPROPion XL (WELLBUTRIN XL) 300 MG 24 hr tablet TAKE 1 TABLET BY MOUTH DAILY 90 tablet 3    citalopram (CeleXA) 10 MG tablet TAKE 1 TABLET BY MOUTH EVERY  tablet 1    letrozole (FEMARA) 2.5 MG tablet Take 1 tablet by mouth Daily.      levothyroxine (SYNTHROID, LEVOTHROID) 50 MCG tablet TAKE 1 TABLET BY MOUTH DAILY 90 tablet 3    omega-3 acid ethyl esters (LOVAZA) 1 g capsule TAKE 1 CAPSULE BY MOUTH TWICE DAILY 180 capsule 3         PROCEDURES  Procedures            PROGRESS, DATA ANALYSIS, CONSULTS, AND MEDICAL DECISION MAKING  All labs have been independently interpreted by me.  All radiology studies have been reviewed by me. All EKG's have been independently viewed and interpreted by me.  Discussion below represents my analysis of pertinent findings related to patient's condition, differential diagnosis, treatment plan and final disposition.    DIFFERENTIAL    My  differential diagnosis includes but is not limited to infected cat bite, hematoma, contusion    Clinical Scores:                  ED Course as of 07/25/24 1304   Thu Jul 25, 2024   1303 Patient has a cat bite to the right hand.  There does appear to be well localized relatively small area of infection, she does not have any systemic symptoms.  We discussed the nature of cat bites and potential for rapid progression of infection.  I have encouraged her to have a low threshold to return to the ER for immediate repeat evaluation if she has any escalation of symptoms, fever, or if not starting to improve within 48 hours of antibiotic treatment.  She is agreeable.  Stable for discharge at this time. [KA]      ED Course User Index  [KA] Vanessa Terry PA-C             AS OF 13:04 EDT VITALS:    BP - 147/89  HR - 85  TEMP - 97.5 °F (36.4 °C) (Tympanic)  O2 SATS - 99%    COMPLEXITY OF CARE  Admission was considered but after careful review of the patient's presentation, physical examination, diagnostic results, and response to treatment the patient may be safely discharged with outpatient follow-up.      DIAGNOSIS  Final diagnoses:   Cat bite of right hand, initial encounter   Infected cat bite, initial encounter         DISPOSITION  ED Disposition       ED Disposition   Discharge    Condition   Good    Comment   --                  FOLLOW UP  Shreyas Menjivar,   [address] [facility] [address] [facility][[address]  [facility] [id number] [phone]    In 2 days      [facility] EMERGENCY DEPARTMENT  [address] [facility] [address] [address] [phone]    If symptoms worsen or any concerns        Prescribed Medications     Medication List        New Prescriptions      amoxicillin-clavulanate 875-125 MG per tablet  Commonly known as: AUGMENTIN  Take 1 tablet by mouth 2 (Two) Times a Day for 10 days.               Where to Get Your Medications        These medications were sent to [facility]  [address] [place] [address]      Hours: Monday to Friday 7 AM to 6 PM, Saturday & Sunday 8 AM to 4:30 PM (Closed 12 PM to 12:30 PM) Phone: [phone]   amoxicillin-clavulanate 875-125 MG per tablet                   Please note that portions of this document were completed with a voice recognition program.    Note Disclaimer: At [facility], we believe that sharing information builds trust and better relationships. You are receiving this note because you recently visited [facility]. It is possible you will see health information before a provider has talked with you about it. This kind of information can be easy to misunderstand. To help you fully understand what it means for your health, we urge you to discuss this note with your provider.         Vanessa Terry PA-C  07/25/24 8570

## 2024-07-25 NOTE — DISCHARGE INSTRUCTIONS
Monitor closely for signs of worsening infection.  If you have spreading redness, increasing swelling or pain, fever, any concerns, return to the ER immediately for repeat evaluation.

## 2024-07-25 NOTE — ED PROVIDER NOTES
MD ATTESTATION NOTE  I supervised care provided by the midlevel provider. We have discussed this patient's history, physical exam, and treatment plan. I have reviewed the midlevel provider's note and I agree with the midlevel provider's findings and plan of care.   SHARED VISIT: This visit was performed by BOTH a physician and an APC. The substantive portion of the medical decision making was performed by this attesting physician who made or approved the management plan and takes responsibility for patient management. All studies in the APC note (if performed) were independently interpreted by me.   I have personally had a face to face encounter with the patient.     PCP: Shreyas Menjivar DO  Patient Care Team:  Shreyas Menjivar DO as PCP - General (Internal Medicine)  Mindy Regan MD as Consulting Physician (Obstetrics and Gynecology)     Ciara Alonzo is a 69 y.o. female who presents to the ED c/o cat bite to her Right hand.  Patient reports that her 2 cats were fighting last night and she got bitten when she was breaking.  Patient reports immunizations are up-to-date and animal has been acting normally.  Patient reports that she has had slight redness and swelling there, no drainage.  Patient denies any streaking, no difficulty with hand movements, no fever shakes chills or night sweats.  Patient reports her tetanus is up-to-date.  Prior to injury patient was at baseline health.    On exam:  General: NAD.  Head: NCAT.  ENT: nares patent, no scleral icterus  Neck: Supple, trachea midline.  Cardiac: regular rate and rhythm.  Lungs: normal effort, clear to auscultation bilaterally  Abdomen: Soft, nondistended, NTTP, no rebound tenderness, no guarding or rigidity.   Extremities: Moves all extremities well, no peripheral edema  Neuro: alert, MAEW, follows commands  Psych: calm, cooperative  Skin: Warm, dry.  Right hand: Puncture on the dorsum between the first and second metatarsals with trace surrounding erythema  and swelling.  Erythema and swelling does not extend to the fingers, fingers have full range of motion, no pain with passive extension.  Neurovascular intact.    Medical Decision Making:  After the initial H&P, I discussed pertinent information from history and physical exam with patient/family.  Discussed differential diagnosis.  Discussed plan for ED evaluation/work-up/treatment.  All questions answered.  Patient/family is agreeable with plan.    ED Course as of 07/25/24 1721   Thu Jul 25, 2024   1303 Patient has a cat bite to the right hand.  There does appear to be well localized relatively small area of infection, she does not have any systemic symptoms.  We discussed the nature of cat bites and potential for rapid progression of infection.  I have encouraged her to have a low threshold to return to the ER for immediate repeat evaluation if she has any escalation of symptoms, fever, or if not starting to improve within 48 hours of antibiotic treatment.  She is agreeable.  Stable for discharge at this time. [KA]      ED Course User Index  [KA] Vanessa Terry PA-C       Diagnosis  Final diagnoses:   Cat bite of right hand, initial encounter   Infected cat bite, initial encounter          Eleazar Chan MD  07/25/24 1721

## 2024-07-30 ENCOUNTER — OFFICE VISIT (OUTPATIENT)
Dept: INTERNAL MEDICINE | Facility: CLINIC | Age: 69
End: 2024-07-30
Payer: MEDICARE

## 2024-07-30 VITALS
OXYGEN SATURATION: 97 % | HEIGHT: 63 IN | DIASTOLIC BLOOD PRESSURE: 76 MMHG | WEIGHT: 126.6 LBS | SYSTOLIC BLOOD PRESSURE: 120 MMHG | HEART RATE: 101 BPM | BODY MASS INDEX: 22.43 KG/M2

## 2024-07-30 DIAGNOSIS — S61.451D CAT BITE OF RIGHT HAND, SUBSEQUENT ENCOUNTER: Primary | ICD-10-CM
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
## 2024-07-30 PROCEDURE — 99213 OFFICE O/P EST LOW 20 MIN: CPT | Performed by: STUDENT IN AN ORGANIZED HEALTH CARE EDUCATION/TRAINING PROGRAM

## 2024-07-30 PROCEDURE — 1126F AMNT PAIN NOTED NONE PRSNT: CPT | Performed by: STUDENT IN AN ORGANIZED HEALTH CARE EDUCATION/TRAINING PROGRAM

## 2024-07-30 NOTE — PROGRESS NOTES
Answers submitted by the patient for this visit:  Other (Submitted on 7/28/2024)  Please describe your symptoms.: Check cat bite  Have you had these symptoms before?: No  How long have you been having these symptoms?: 1-4 days  Please list any medications you are currently taking for this condition.: Na  Please describe any probable cause for these symptoms. : Na  Primary Reason for Visit (Submitted on 7/28/2024)  What is the primary reason for your visit?: Other    Samson Kelly M.D.  Internal Medicine  [address], [address]  [place] [address]  [phone]      Chief Complaint  Hospital Follow Up Visit (ER F/U Cat bite on top of right hand /)    SUBJECTIVE    History of Present Illness    Ciara Alonzo is a 69 y.o. female with hypothyroidism, hyperlipidemia, history of breast cancer, osteoporosis, depression who presents to the office today as an established patient of Dr. Menjivar here for an acute visit.    She went to the emergency department July 25 for a cat bite day before to her right hand.  She was been breaking up a fight between her cat.  She reported immunizations are up-to-date and animals were acting normally.  Her tetanus was up-to-date.  She was prescribed 10 days of Augmentin.    retirement through antibiotic. No drainage. Wound improving. Put bandaid on a few nights when she had purulent drainage which has stopped.  No fevers.     Review of Systems    No Known Allergies     Outpatient Medications Marked as Taking for the 7/30/24 encounter (Office Visit) with Samson Kelly MD   Medication Sig Dispense Refill    amoxicillin-clavulanate (AUGMENTIN) 875-125 MG per tablet Take 1 tablet by mouth 2 (Two) Times a Day for 10 days. 20 tablet 0    atorvastatin (LIPITOR) 20 MG tablet TAKE 1 TABLET BY MOUTH DAILY 90 tablet 1    buPROPion XL (WELLBUTRIN XL) 300 MG 24 hr tablet TAKE 1 TABLET BY MOUTH DAILY 90 tablet 3    citalopram (CeleXA) 10 MG tablet TAKE 1 TABLET BY MOUTH  EVERY  tablet 1    letrozole (FEMARA) 2.5 MG tablet Take 1 tablet by mouth Daily.      levothyroxine (SYNTHROID, LEVOTHROID) 50 MCG tablet TAKE 1 TABLET BY MOUTH DAILY 90 tablet 3    omega-3 acid ethyl esters (LOVAZA) 1 g capsule TAKE 1 CAPSULE BY MOUTH TWICE DAILY 180 capsule 3        Past Medical History:   Diagnosis Date    Anxiety and depression     Arthritis     left knee    Breast cancer     right breast    Cervical cancer     s/p conization and hysterectomy    Hyperlipidemia     Hypothyroid     after partial thyroidectomy at age 30.    Metacarpal bone fracture     Osteoporosis     PONV (postoperative nausea and vomiting)     Rotator cuff injury 2014    completed physical therapy     Past Surgical History:   Procedure Laterality Date    BREAST LUMPECTOMY WITH SENTINEL NODE BIOPSY Right 07/27/2017    BREAST SURGERY  2017    Lumpectomy    CERVICAL CONIZATION      COLONOSCOPY N/A 01/30/2018    Procedure: COLONOSCOPY TO CECUM AND INTO TERMINAL ILEUM WITH COLD BIOPSY POLYPECTOMY;  Surgeon: Mee Hou MD;  Location: [facility];  Service:     COLONOSCOPY N/A 06/15/2021    Procedure: COLONOSCOPY to cecum and ti with cold bx polypectomy;  Surgeon: Mee Hou MD;  Location: [facility] ENDOSCOPY;  Service: Gastroenterology;  Laterality: N/A;  PRE: Hx of Colon Polyps  POST: Polyp, Diverticuosis, Hemorrhoids    DILATION AND CURETTAGE, DIAGNOSTIC / THERAPEUTIC      HYSTERECTOMY      THYROIDECTOMY, PARTIAL      WRIST SURGERY Right 2020    Surgery in June and December     Family History   Problem Relation Age of Onset    Hypertension Mother     Dementia Mother     Stroke Father 81        Stroke at 81    Stroke Brother 69    Heart attack Brother     Malig Hyperthermia Neg Hx     reports that she has never smoked. She has never used smokeless tobacco. She reports that she does not currently use alcohol. She reports current drug use. Drug: Marijuana.    OBJECTIVE    Vital Signs:   /76   Pulse 101   Ht  "160 cm (62.99\")   Wt 57.4 kg (126 lb 9.6 oz)   SpO2 97%   BMI 22.43 kg/m²     Physical Exam  Constitutional:       General: She is not in acute distress.     Appearance: Normal appearance.   Pulmonary:      Effort: Pulmonary effort is normal. No respiratory distress.   Neurological:      Mental Status: She is alert. Mental status is at baseline.   Psychiatric:         Mood and Affect: Mood normal.         Behavior: Behavior normal.         Thought Content: Thought content normal.      Puncture wound on dorsal right hand is crusted over. No erythema or drainage      The following data was reviewed by: Samson Kelly MD on 07/30/2024:  CMP          11/20/2023    07:52   CMP   Glucose 90    BUN 13    Creatinine 0.78    Sodium 140    Potassium 4.9    Chloride 104    Calcium 9.5    Total Protein 6.6    Albumin 4.8    Globulin 1.8    Total Bilirubin 0.2    Alkaline Phosphatase 62    AST (SGOT) 15    ALT (SGPT) 12    BUN/Creatinine Ratio 16.7      CBC w/diff          11/20/2023    07:52   CBC w/Diff   WBC 5.94    RBC 4.38    Hemoglobin 13.8    Hematocrit 39.6    MCV 90.4    MCH 31.5    MCHC 34.8    RDW 12.7    Platelets 291    Neutrophil Rel % 64.2    Lymphocyte Rel % 24.4    Monocyte Rel % 6.6    Eosinophil Rel % 3.5    Basophil Rel % 1.0      Lipid Panel          11/20/2023    07:52 4/22/2024    10:23   Lipid Panel   Total Cholesterol 219  155    Triglycerides 73  98    HDL Cholesterol 69  64    VLDL Cholesterol 13  18    LDL Cholesterol  137  73      TSH          11/20/2023    07:52   TSH   TSH 1.430        Data reviewed : Emergency department note              ASSESSMENT & PLAN     Diagnoses and all orders for this visit:    1. Cat bite of right hand, subsequent encounter (Primary)    Continue Augmentin  Wash with warm soapy water daily  Can try antihistamine if itching is bothersome  UTD on tetanus  Patient counseled to seek medical care for new or worsening symptoms or failure of symptoms to improve.          Health " Maintenance Due   Topic Date Due    COVID-19 Vaccine (6 - 2023-24 season) 03/13/2024        Follow Up  No follow-ups on file.    Patient/family had no further questions at this time and verbalized understanding of the plan discussed today.

## 2024-11-25 DIAGNOSIS — F41.8 DEPRESSION WITH ANXIETY: ICD-10-CM

## 2024-11-25 RX ORDER — BUPROPION HYDROCHLORIDE 300 MG/1
300 TABLET ORAL DAILY
Qty: 90 TABLET | Refills: 3 | OUTPATIENT
Start: 2024-11-25

## 2024-12-10 ENCOUNTER — TELEPHONE (OUTPATIENT)
Dept: INTERNAL MEDICINE | Facility: CLINIC | Age: 69
End: 2024-12-10
Payer: MEDICARE

## 2024-12-10 NOTE — TELEPHONE ENCOUNTER
Caller: Ciara Alonzo    Relationship: Self    Best call back number: [phone]     What orders are you requesting (i.e. lab or imaging): LABS FOR WELLNESS    In what timeframe would the patient need to come in: ASAP    Where will you receive your lab/imaging services: IN HOUSE    Additional notes: PLEASE ORDER LABS AND CALL PATIENT TO SCHEDULE APPOINTMENT TO HAVE THEM DRAWN.

## 2024-12-18 ENCOUNTER — OFFICE VISIT (OUTPATIENT)
Dept: INTERNAL MEDICINE | Facility: CLINIC | Age: 69
End: 2024-12-18
Payer: MEDICARE

## 2024-12-18 VITALS
BODY MASS INDEX: 21.44 KG/M2 | TEMPERATURE: 97.5 F | HEIGHT: 63 IN | SYSTOLIC BLOOD PRESSURE: 120 MMHG | WEIGHT: 121 LBS | DIASTOLIC BLOOD PRESSURE: 78 MMHG | HEART RATE: 93 BPM | OXYGEN SATURATION: 98 %

## 2024-12-18 DIAGNOSIS — M81.0 AGE-RELATED OSTEOPOROSIS WITHOUT CURRENT PATHOLOGICAL FRACTURE: ICD-10-CM

## 2024-12-18 DIAGNOSIS — Z23 NEED FOR COVID-19 VACCINE: ICD-10-CM

## 2024-12-18 DIAGNOSIS — Z00.00 MEDICARE ANNUAL WELLNESS VISIT, SUBSEQUENT: Primary | ICD-10-CM

## 2024-12-18 DIAGNOSIS — Z23 NEED FOR IMMUNIZATION AGAINST INFLUENZA: ICD-10-CM

## 2024-12-18 DIAGNOSIS — Z00.00 ANNUAL PHYSICAL EXAM: ICD-10-CM

## 2024-12-18 DIAGNOSIS — R13.19 ESOPHAGEAL DYSPHAGIA: ICD-10-CM

## 2024-12-18 DIAGNOSIS — E03.9 ACQUIRED HYPOTHYROIDISM: ICD-10-CM

## 2024-12-18 PROCEDURE — 99214 OFFICE O/P EST MOD 30 MIN: CPT | Performed by: STUDENT IN AN ORGANIZED HEALTH CARE EDUCATION/TRAINING PROGRAM

## 2024-12-18 PROCEDURE — G0439 PPPS, SUBSEQ VISIT: HCPCS | Performed by: STUDENT IN AN ORGANIZED HEALTH CARE EDUCATION/TRAINING PROGRAM

## 2024-12-18 PROCEDURE — 96160 PT-FOCUSED HLTH RISK ASSMT: CPT | Performed by: STUDENT IN AN ORGANIZED HEALTH CARE EDUCATION/TRAINING PROGRAM

## 2024-12-18 PROCEDURE — G0008 ADMIN INFLUENZA VIRUS VAC: HCPCS | Performed by: STUDENT IN AN ORGANIZED HEALTH CARE EDUCATION/TRAINING PROGRAM

## 2024-12-18 PROCEDURE — 90480 ADMN SARSCOV2 VAC 1/ONLY CMP: CPT | Performed by: STUDENT IN AN ORGANIZED HEALTH CARE EDUCATION/TRAINING PROGRAM

## 2024-12-18 PROCEDURE — 91320 SARSCV2 VAC 30MCG TRS-SUC IM: CPT | Performed by: STUDENT IN AN ORGANIZED HEALTH CARE EDUCATION/TRAINING PROGRAM

## 2024-12-18 PROCEDURE — 90662 IIV NO PRSV INCREASED AG IM: CPT | Performed by: STUDENT IN AN ORGANIZED HEALTH CARE EDUCATION/TRAINING PROGRAM

## 2024-12-18 PROCEDURE — 1126F AMNT PAIN NOTED NONE PRSNT: CPT | Performed by: STUDENT IN AN ORGANIZED HEALTH CARE EDUCATION/TRAINING PROGRAM

## 2024-12-18 PROCEDURE — 99397 PER PM REEVAL EST PAT 65+ YR: CPT | Performed by: STUDENT IN AN ORGANIZED HEALTH CARE EDUCATION/TRAINING PROGRAM

## 2024-12-18 NOTE — PROGRESS NOTES
Subjective   The ABCs of the Annual Wellness Visit  Medicare Wellness Visit      Ciara Alonzo is a 69 y.o. patient who presents for a Medicare Wellness Visit.    The following portions of the patient's history were reviewed and   updated as appropriate: allergies, current medications, past family history, past medical history, past social history, past surgical history, and problem list.    Anxiety and depression: takes citalopram 10 mg daily and bupropion  mg daily. She thinks treatment is OK with these medications.     Hypothyroidism: takes levothyroxine 50 mcg daily . States she has had partial thyroidectomy around age 30 for a mass that was noncancerous.   Lab Results   Component Value Date    TSH 1.430 11/20/2023        right breast conserving surgical procedure for a stage I (T1CN0) ductal carcinoma: Letrozole initiated September 2017. XRT completed 10/26/17. No chemotherapy.  Follows with [facility]. Now finished with treatment.     Osteoporosis: previously on denosumab and currently on reclast . Managed by [facility]. One fragility fracture in 2020 right hand. Not on calcium or vitamin D.     Hyperlipidemia: on atorvastatin 20 mg daily for primary prevention  Lab Results   Component Value Date    CHLPL 155 04/22/2024    TRIG 98 04/22/2024    HDL 64 (H) 04/22/2024    LDL 73 04/22/2024     States for a few years , maybe up to 5 years, sensation of things getting stuck in the chest with  bigger items such as a big bite food or big pills. No pain with swallowing.   No blood in the stool. No unexplained weight loss. No family history of esophageal cancer. Doesn't seem to be getting worse over time. States this happens around 1 time monthly.    Compared to one year ago, the patient's physical   health is better.  Compared to one year ago, the patient's mental   health is the same.    Recent Hospitalizations:  She was not admitted to the hospital during the last year.     Current Medical  "Providers:  Patient Care Team:  Shreyas Menjivar DO as PCP - General (Internal Medicine)  Mindy Regan MD as Consulting Physician (Obstetrics and Gynecology)    Outpatient Medications Prior to Visit   Medication Sig Dispense Refill    atorvastatin (LIPITOR) 20 MG tablet TAKE 1 TABLET BY MOUTH DAILY 90 tablet 1    buPROPion XL (WELLBUTRIN XL) 300 MG 24 hr tablet TAKE 1 TABLET BY MOUTH DAILY 90 tablet 3    citalopram (CeleXA) 10 MG tablet TAKE 1 TABLET BY MOUTH EVERY  tablet 1    levothyroxine (SYNTHROID, LEVOTHROID) 50 MCG tablet TAKE 1 TABLET BY MOUTH DAILY 90 tablet 3    letrozole (FEMARA) 2.5 MG tablet Take 1 tablet by mouth Daily. (Patient not taking: Reported on 12/18/2024)      omega-3 acid ethyl esters (LOVAZA) 1 g capsule TAKE 1 CAPSULE BY MOUTH TWICE DAILY (Patient not taking: Reported on 12/18/2024) 180 capsule 3     No facility-administered medications prior to visit.     No opioid medication identified on active medication list. I have reviewed chart for other potential  high risk medication/s and harmful drug interactions in the elderly.      Aspirin is not on active medication list.  Aspirin use is not indicated based on review of current medical condition/s. Risk of harm outweighs potential benefits.  .    Patient Active Problem List   Diagnosis    Atypical migraine    Depression with anxiety    Hyperlipidemia    Hypothyroidism    Osteoporosis    History of right breast cancer    Aromatase inhibitor use     Advance Care Planning Advance Directive is not on file.  ACP discussion was held with the patient during this visit. Patient does not have an advance directive, information provided.            Objective   Vitals:    12/18/24 1304   BP: 130/80   Pulse: 93   Temp: 97.5 °F (36.4 °C)   TempSrc: Infrared   SpO2: 98%   Weight: 54.9 kg (121 lb)   Height: 160 cm (62.99\")   PainSc: 0-No pain     Physical Exam  Vitals reviewed.   Constitutional:       General: She is not in acute distress.     " "Appearance: Normal appearance. She is normal weight. She is not ill-appearing.   HENT:      Head: Normocephalic and atraumatic.      Right Ear: Tympanic membrane, ear canal and external ear normal. There is no impacted cerumen.      Left Ear: Tympanic membrane, ear canal and external ear normal. There is no impacted cerumen.      Mouth/Throat:      Mouth: Mucous membranes are moist.      Pharynx: No oropharyngeal exudate or posterior oropharyngeal erythema.   Eyes:      General: No scleral icterus.     Extraocular Movements: Extraocular movements intact.      Conjunctiva/sclera: Conjunctivae normal.      Pupils: Pupils are equal, round, and reactive to light.   Cardiovascular:      Rate and Rhythm: Normal rate and regular rhythm.      Heart sounds: Normal heart sounds. No murmur heard.  Pulmonary:      Effort: Pulmonary effort is normal. No respiratory distress.      Breath sounds: Normal breath sounds. No wheezing.   Abdominal:      General: Bowel sounds are normal. There is no distension.      Palpations: Abdomen is soft.      Tenderness: There is no abdominal tenderness. There is no guarding.   Musculoskeletal:      Cervical back: Neck supple.      Right lower leg: No edema.      Left lower leg: No edema.   Lymphadenopathy:      Cervical: No cervical adenopathy.   Skin:     General: Skin is warm and dry.      Coloration: Skin is not jaundiced.   Neurological:      General: No focal deficit present.      Mental Status: She is alert and oriented to person, place, and time.      Cranial Nerves: No cranial nerve deficit.      Motor: No weakness.   Psychiatric:         Mood and Affect: Mood normal.         Behavior: Behavior normal.         Thought Content: Thought content normal.         Estimated body mass index is 21.44 kg/m² as calculated from the following:    Height as of this encounter: 160 cm (62.99\").    Weight as of this encounter: 54.9 kg (121 lb).    BMI is within normal parameters. No other follow-up for " BMI required.           Does the patient have evidence of cognitive impairment? No                                                                                               Health  Risk Assessment    Smoking Status:  Social History     Tobacco Use   Smoking Status Never   Smokeless Tobacco Never     Alcohol Consumption:  Social History     Substance and Sexual Activity   Alcohol Use Yes    Alcohol/week: 0.0 - 1.0 standard drinks of alcohol       Fall Risk Screen  STEADI Fall Risk Assessment was completed, and patient is at LOW risk for falls.Assessment completed on:2024    Depression Screening   Little interest or pleasure in doing things? Not at all   Feeling down, depressed, or hopeless? Not at all   PHQ-2 Total Score 0      Health Habits and Functional and Cognitive Screenin/12/2024     8:18 PM   Functional & Cognitive Status   Do you have difficulty preparing food and eating? No    Do you have difficulty bathing yourself, getting dressed or grooming yourself? No    Do you have difficulty using the toilet? No    Do you have difficulty moving around from place to place? No    Do you have trouble with steps or getting out of a bed or a chair? No    Current Diet Well Balanced Diet    Dental Exam Up to date    Eye Exam Up to date    Exercise (times per week) 4 times per week    Current Exercises Include Gardening;Home Exercise Program (TV, Computer, Etc.);Pilates    Do you need help using the phone?  No    Are you deaf or do you have serious difficulty hearing?  No    Do you need help to go to places out of walking distance? No    Do you need help shopping? No    Do you need help preparing meals?  No    Do you need help with housework?  No    Do you need help with laundry? No    Do you need help taking your medications? No    Do you need help managing money? No    Do you ever drive or ride in a car without wearing a seat belt? No    Have you felt unusual stress, anger or loneliness in the last  month? No    Who do you live with? Spouse    If you need help, do you have trouble finding someone available to you? No    Have you been bothered in the last four weeks by sexual problems? No    Do you have difficulty concentrating, remembering or making decisions? No        Patient-reported           Age-appropriate Screening Schedule:  Refer to the list below for future screening recommendations based on patient's age, sex and/or medical conditions. Orders for these recommended tests are listed in the plan section. The patient has been provided with a written plan.    Health Maintenance List  Health Maintenance   Topic Date Due    INFLUENZA VACCINE  07/01/2024    COVID-19 Vaccine (6 - 2024-25 season) 09/01/2024    LIPID PANEL  04/22/2025    ANNUAL WELLNESS VISIT  12/18/2025    MAMMOGRAM  02/14/2026    DXA SCAN  02/20/2026    TDAP/TD VACCINES (2 - Td or Tdap) 10/16/2027    COLORECTAL CANCER SCREENING  06/15/2031    HEPATITIS C SCREENING  Completed    Pneumococcal Vaccine 65+  Completed    ZOSTER VACCINE  Completed                                                                                                                                                CMS Preventative Services Quick Reference  Risk Factors Identified During Encounter  Depression/Dysphoria: Current medication is effective, no change recommended  Immunizations Discussed/Encouraged: Influenza, COVID19, and RSV (Respiratory Syncytial Virus)  Dental Screening Recommended  Vision Screening Recommended    The above risks/problems have been discussed with the patient.  Pertinent information has been shared with the patient in the After Visit Summary.  An After Visit Summary and PPPS were made available to the patient.    Follow Up:   Next Medicare Wellness visit to be scheduled in 1 year.     Patient's annual Complete Physical Exam was also completed on this date:   -Updated and reviewed past medical, family, social and surgical histories as well as  allergies. Addressed care gaps listed in the medical record. Reviewed and updated medication list.   -Encouraged minimum of 30 minutes or more of exercise at a brisk walk or higher 5 days per week.  -Immunizations reviewed and updated in EMR.  -Physical exam findings documented above  Other Pertinent Preventative Topics Reviewed:   -Lipid screening:  Patient is already on statin therapy.   -Aspirin for primary or secondary prevention: Not applicable, patient is greater than age 60 and risks outweigh benefits for primary prevention.  -Diabetes screening:  Screening not indicated at this time.   -Hypertension screening: Patient screened negative for HTN today.  -HIV screening: Patient is over age 65, screening not indicated.   -Syphilis screening: Syphilis screening not indicated.  -Hepatitis B virus screening: Screening not indicated, not in a high-risk group.  -Hepatitis C virus screening:  Patient has already completed Hepatitis C screening. Negative screening on file.   -Colon cancer screening: Patient is already up to date on their colon cancer screening with colonoscopy and screening is indicated again in  2031  -Lung cancer screening: Patient has never smoked.  -Cervical cancer screening:  s/p hysterectomy   -Breast cancer screening: Informed patient that the USPSTF recommends biennial screening mammography for women aged 50 to 74 years. was done on approximately 2/2024 and the result was: Birads II (Benign findings).  -Osteoporosis screening: known osteoporosis     A problem-based visit was also conducted on the same day, see below for assessment and plan    Diagnoses and all orders for this visit:    1. Acquired hypothyroidism (Primary)  - takes levothyroxine 50 mcg daily . States she has had partial thyroidectomy around age 30 for a mass that was noncancerous.   Lab Results   Component Value Date    TSH 1.430 11/20/2023     -recheck tsh for continued annual monitoring, adjust regimen if needed    2.  Age-related osteoporosis without current pathological fracture  -previously on denosumab and currently on reclast . Managed by [facility]. One fragility fracture in 2020 right hand. Not on calcium or vitamin D.  -she is physically active with walking.  -I reviewed 10/2024 CMP from [facility] which showed normal calcium  -will check vitamin D to ensure at goal without supplementation   -otherwise, continue Reclast per [facility].   -     CBC & Differential  -     Vitamin D,25-Hydroxy    3. Esophageal dysphagia   -States for a few years , maybe up to 5 years, sensation of things getting stuck in the chest with  bigger items such as a big bite food or big pills. No pain with swallowing.   No blood in the stool. No unexplained weight loss. No family history of esophageal cancer. Doesn't seem to be getting worse over time. States this happens around 1 time monthly. Weight is down some from 133 lbs 4/2024 to 121 lbs today.  -at this point we discussed potential causes including motility disorder, esophageal strictures, even rarely cancer. Recommend GI consultation to discuss EGD to further evaluate but at this time she declines and prefers to monitor for this to get worse. I advised her that is the less preferred option but ultimately her choice. I will see her back in 6 months for continued monitoring. Let me know at any point if she changes her mind.             The following social determinates of health impact the patient's medical decision making: No social determinates of health were factored in to today's visit.     Follow Up  Return in about 6 months (around 6/18/2025) for Recheck.

## 2024-12-19 LAB
25(OH)D3+25(OH)D2 SERPL-MCNC: 30.1 NG/ML (ref 30–100)
BASOPHILS # BLD AUTO: 0.1 X10E3/UL (ref 0–0.2)
BASOPHILS NFR BLD AUTO: 1 %
EOSINOPHIL # BLD AUTO: 0.2 X10E3/UL (ref 0–0.4)
EOSINOPHIL NFR BLD AUTO: 2 %
ERYTHROCYTE [DISTWIDTH] IN BLOOD BY AUTOMATED COUNT: 13.1 % (ref 11.7–15.4)
HCT VFR BLD AUTO: 38.3 % (ref 34–46.6)
HGB BLD-MCNC: 12.8 G/DL (ref 11.1–15.9)
IMM GRANULOCYTES # BLD AUTO: 0 X10E3/UL (ref 0–0.1)
IMM GRANULOCYTES NFR BLD AUTO: 0 %
LYMPHOCYTES # BLD AUTO: 1.8 X10E3/UL (ref 0.7–3.1)
LYMPHOCYTES NFR BLD AUTO: 21 %
MCH RBC QN AUTO: 30.7 PG (ref 26.6–33)
MCHC RBC AUTO-ENTMCNC: 33.4 G/DL (ref 31.5–35.7)
MCV RBC AUTO: 92 FL (ref 79–97)
MONOCYTES # BLD AUTO: 0.6 X10E3/UL (ref 0.1–0.9)
MONOCYTES NFR BLD AUTO: 7 %
NEUTROPHILS # BLD AUTO: 5.8 X10E3/UL (ref 1.4–7)
NEUTROPHILS NFR BLD AUTO: 69 %
PLATELET # BLD AUTO: 280 X10E3/UL (ref 150–450)
RBC # BLD AUTO: 4.17 X10E6/UL (ref 3.77–5.28)
TSH SERPL DL<=0.005 MIU/L-ACNC: 1.77 UIU/ML (ref 0.45–4.5)
WBC # BLD AUTO: 8.4 X10E3/UL (ref 3.4–10.8)

## 2025-01-17 DIAGNOSIS — E78.2 MIXED HYPERLIPIDEMIA: ICD-10-CM

## 2025-01-20 RX ORDER — ATORVASTATIN CALCIUM 20 MG/1
20 TABLET, FILM COATED ORAL DAILY
Qty: 90 TABLET | Refills: 2 | Status: SHIPPED | OUTPATIENT
Start: 2025-01-20

## 2025-02-21 DIAGNOSIS — F41.8 DEPRESSION WITH ANXIETY: ICD-10-CM

## 2025-02-21 DIAGNOSIS — E03.9 ACQUIRED HYPOTHYROIDISM: ICD-10-CM

## 2025-02-21 RX ORDER — CITALOPRAM HYDROBROMIDE 10 MG/1
10 TABLET ORAL DAILY
Qty: 180 TABLET | Refills: 1 | Status: SHIPPED | OUTPATIENT
Start: 2025-02-21

## 2025-02-21 RX ORDER — LEVOTHYROXINE SODIUM 50 UG/1
50 TABLET ORAL DAILY
Qty: 90 TABLET | Refills: 3 | Status: SHIPPED | OUTPATIENT
Start: 2025-02-21

## 2025-02-21 RX ORDER — BUPROPION HYDROCHLORIDE 300 MG/1
300 TABLET ORAL DAILY
Qty: 90 TABLET | Refills: 3 | Status: SHIPPED | OUTPATIENT
Start: 2025-02-21

## 2025-02-21 NOTE — TELEPHONE ENCOUNTER
Caller: Ciara Alonzo    Relationship: Self    Best call back number: [phone]     Requested Prescriptions:   Requested Prescriptions     Pending Prescriptions Disp Refills    buPROPion XL (WELLBUTRIN XL) 300 MG 24 hr tablet 90 tablet 3     Sig: Take 1 tablet by mouth Daily.    levothyroxine (SYNTHROID, LEVOTHROID) 50 MCG tablet 90 tablet 3     Sig: Take 1 tablet by mouth Daily.    citalopram (CeleXA) 10 MG tablet 180 tablet 1     Sig: Take 1 tablet by mouth Daily.        Pharmacy where request should be sent: [id number] [place] [id number] [address] AT [facility] - [phone] [facility] [phone]      Last office visit with prescribing clinician: 12/18/2024   Last telemedicine visit with prescribing clinician: Visit date not found   Next office visit with prescribing clinician: 6/18/2025     Does the patient have less than a 3 day supply:  [] Yes  [x] No    Would you like a call back once the refill request has been completed: [] Yes [] No    If the office needs to give you a call back, can they leave a voicemail: [] Yes [x] No    Yanni Harp Rep   02/21/25 11:34 EST

## 2025-06-18 ENCOUNTER — OFFICE VISIT (OUTPATIENT)
Dept: INTERNAL MEDICINE | Facility: CLINIC | Age: 70
End: 2025-06-18
Payer: MEDICARE

## 2025-06-18 VITALS
TEMPERATURE: 97.6 F | HEIGHT: 63 IN | OXYGEN SATURATION: 97 % | HEART RATE: 80 BPM | WEIGHT: 118.4 LBS | BODY MASS INDEX: 20.98 KG/M2 | DIASTOLIC BLOOD PRESSURE: 80 MMHG | SYSTOLIC BLOOD PRESSURE: 116 MMHG

## 2025-06-18 DIAGNOSIS — E78.2 MIXED HYPERLIPIDEMIA: ICD-10-CM

## 2025-06-18 DIAGNOSIS — R13.19 ESOPHAGEAL DYSPHAGIA: Primary | ICD-10-CM

## 2025-06-18 NOTE — PROGRESS NOTES
Shrey[facility] Men[facility]ivar DO, [facility]  Internal Medicine  [facility]  [address], [address]  [place] [address]  [phone]    Chief Complaint  6 mos check-up (lipids)    SUBJECTIVE    History of Present Illness    Ciara Alonzo is a 70 y.o. female who presents to the office today as an established patient that last saw me on 12/18/2024.     Hyperlipidemia: on atorvastatin 20 mg daily for primary prevention . Doesn't eat fast food at all and tries to avoid fried/greasy foods. For exercise she walks and gardens. She tries to use steps when possible.     Patient states she also like to discuss her swallowing again.  She states that for 6 years now she has been having a sensation of something getting stuck in the upper chest after eating.  It happens a few times per week when her food is too big of a bite.  It never happens with water.  The symptoms are overall stable since her last visit at which time she declined pursuing a GI evaluation.  She states now she would like to have an evaluation.  She states that most of the time the symptoms only last a minute or 2 but on 2 total occasions she has had to throw up because of the sensation lasting longer and unable to swallow the food.  There is no blood in the stool, no pain with swallowing, no hematemesis, no unexplained weight loss that she feels is present, no family history of esophageal cancer.    No Known Allergies     Outpatient Medications Marked as Taking for the 6/18/25 encounter (Office Visit) with Shreyas Menjivar DO   Medication Sig Dispense Refill    atorvastatin (LIPITOR) 20 MG tablet TAKE 1 TABLET BY MOUTH DAILY 90 tablet 2    buPROPion XL (WELLBUTRIN XL) 300 MG 24 hr tablet Take 1 tablet by mouth Daily. 90 tablet 3    citalopram (CeleXA) 10 MG tablet Take 1 tablet by mouth Daily. 180 tablet 1    levothyroxine (SYNTHROID, LEVOTHROID) 50 MCG tablet Take 1 tablet by mouth Daily. 90 tablet 3        Past Medical History:   Diagnosis Date     "Anxiety and depression     Arthritis     left knee    Breast cancer     right breast    Cervical cancer     s/p conization and hysterectomy    Hyperlipidemia     Hypothyroid     after partial thyroidectomy at age 30.    Metacarpal bone fracture     Osteoporosis     PONV (postoperative nausea and vomiting)     Rotator cuff injury 2014    completed physical therapy       OBJECTIVE    Vital Signs:   /80   Pulse 80   Temp 97.6 °F (36.4 °C) (Infrared)   Ht 160 cm (62.99\")   Wt 53.7 kg (118 lb 6.4 oz)   SpO2 97%   BMI 20.98 kg/m²        Physical Exam  Vitals reviewed.   Constitutional:       General: She is not in acute distress.     Appearance: Normal appearance. She is normal weight. She is not ill-appearing.   Eyes:      General: No scleral icterus.  Pulmonary:      Effort: Pulmonary effort is normal. No respiratory distress.   Skin:     Coloration: Skin is not jaundiced.   Neurological:      Mental Status: She is alert.   Psychiatric:         Mood and Affect: Mood normal.         Behavior: Behavior normal.         Thought Content: Thought content normal.                             ASSESSMENT & PLAN     Diagnoses and all orders for this visit:    1. Esophageal dysphagia (Primary)  -Patient states she also like to discuss her swallowing again.  She states that for 6 years now she has been having a sensation of something getting stuck in the upper chest after eating.  It happens a few times per week when her food is too big of a bite.  It never happens with water.  The symptoms are overall stable since her last visit at which time she declined pursuing a GI evaluation.  She states now she would like to have an evaluation.  She states that most of the time the symptoms only last a minute or 2 but on 2 total occasions she has had to throw up because of the sensation lasting longer and unable to swallow the food.  There is no blood in the stool, no pain with swallowing, no hematemesis, no unexplained weight " loss that she feels is present, no family history of esophageal cancer.  -I continue to be encouraging of the GI evaluation as I was last visit and I will place referral today. Of note, her weight has decreased from 126 lbs on our scale in 6/18/2025 to 118 lbs today which is concerning.  -     Ambulatory Referral to Gastroenterology    2. Mixed hyperlipidemia  - on atorvastatin 20 mg daily for primary prevention . Doesn't eat fast food at all and tries to avoid fried/greasy foods. For exercise she walks and gardens. She tries to use steps when possible.   Lab Results   Component Value Date    CHLPL 155 04/22/2024    TRIG 98 04/22/2024    HDL 64 (H) 04/22/2024    LDL 73 04/22/2024     -goal LDL less than 100. She was at goal last year. Recheck lipid profile today for annual reassessment, adjust regimen further if needed.  -     Lipid Panel            Follow Up  Return for Next scheduled follow up.    Patient/family had no further questions at this time and verbalized understanding of the plan discussed today.

## 2025-06-19 LAB
CHOLEST SERPL-MCNC: 154 MG/DL (ref 0–200)
HDLC SERPL-MCNC: 72 MG/DL (ref 40–60)
LDLC SERPL CALC-MCNC: 65 MG/DL (ref 0–100)
TRIGL SERPL-MCNC: 94 MG/DL (ref 0–150)
VLDLC SERPL CALC-MCNC: 17 MG/DL (ref 5–40)

## 2025-06-23 ENCOUNTER — PREP FOR SURGERY (OUTPATIENT)
Dept: SURGERY | Facility: SURGERY CENTER | Age: 70
End: 2025-06-23
Payer: MEDICARE

## 2025-06-23 ENCOUNTER — OFFICE VISIT (OUTPATIENT)
Dept: GASTROENTEROLOGY | Facility: CLINIC | Age: 70
End: 2025-06-23
Payer: MEDICARE

## 2025-06-23 VITALS
SYSTOLIC BLOOD PRESSURE: 136 MMHG | BODY MASS INDEX: 20.75 KG/M2 | TEMPERATURE: 97.7 F | OXYGEN SATURATION: 97 % | WEIGHT: 117.1 LBS | HEART RATE: 82 BPM | DIASTOLIC BLOOD PRESSURE: 80 MMHG | HEIGHT: 63 IN

## 2025-06-23 DIAGNOSIS — R13.10 DYSPHAGIA, UNSPECIFIED TYPE: Primary | ICD-10-CM

## 2025-06-23 PROCEDURE — 99203 OFFICE O/P NEW LOW 30 MIN: CPT

## 2025-06-23 PROCEDURE — 1159F MED LIST DOCD IN RCRD: CPT

## 2025-06-23 PROCEDURE — 1160F RVW MEDS BY RX/DR IN RCRD: CPT

## 2025-06-23 RX ORDER — SODIUM CHLORIDE, SODIUM LACTATE, POTASSIUM CHLORIDE, CALCIUM CHLORIDE 600; 310; 30; 20 MG/100ML; MG/100ML; MG/100ML; MG/100ML
30 INJECTION, SOLUTION INTRAVENOUS CONTINUOUS PRN
OUTPATIENT
Start: 2025-06-23 | End: 2025-06-23

## 2025-06-23 RX ORDER — SODIUM CHLORIDE 0.9 % (FLUSH) 0.9 %
10 SYRINGE (ML) INJECTION AS NEEDED
OUTPATIENT
Start: 2025-06-23

## 2025-06-23 RX ORDER — SODIUM CHLORIDE 0.9 % (FLUSH) 0.9 %
3 SYRINGE (ML) INJECTION EVERY 12 HOURS SCHEDULED
OUTPATIENT
Start: 2025-06-23

## 2025-06-23 NOTE — PROGRESS NOTES
"Chief Complaint   Patient presents with    Difficulty Swallowing           History of Present Illness  70-year-old female presenting for evaluation regarding dysphagia.    Patient reports she has been experiencing dysphagia for several years intermittently.  She feels that food sticks in the middle of her chest and has had a few episodes where she had to bring this back up.  Other times, she feels that food travels slowly, but does not occur every time.  She states that she is very cautious when she eats, especially at restaurants to prevent this from happening in public.  This is worse with dry foods such as bread.  Denies unintentional weight loss.  No heartburn, reflux.  She denies abdominal pain, nausea or vomiting.  Has regular bowel movements without rectal bleeding or melena.    Had a history of radiation on her thyroid in the 80s.  She also has a history of breast cancer and underwent radiation 8 years ago.    No prior history of EGD.  No family history of esophageal cancer or swallowing issues that she is aware of.     Result Review :         COLONOSCOPY (06/15/2021 09:22) descending colon polyp, diverticulosis, hemorrhoids  Tissue Pathology Exam (06/15/2021 10:27) hyperplastic, recall 10 years    COLONOSCOPY (01/30/2018 11:17) rectal polyp  Tissue Pathology Exam - Polyp, Large Intestine, Rectum (01/30/2018 11:49) hyperplastic    Office Visit with Shreyas Menjivar DO (06/18/2025)    Vital Signs:   /80   Pulse 82   Temp 97.7 °F (36.5 °C)   Ht 160 cm (63\")   Wt 53.1 kg (117 lb 1.6 oz)   SpO2 97%   BMI 20.74 kg/m²     Body mass index is 20.74 kg/m².     Physical Exam      Assessment and Plan    Diagnoses and all orders for this visit:    1. Dysphagia, unspecified type (Primary)         Discussion    Plan EGD for further evaluation of dysphagia, occurring intermittently over the last several years.  She does have a history of radiation to her thyroid as well as radiation with breast cancer.  She has no " other GI complaints today.  Evaluate for diverticulum, esophageal stricture, hiatal hernia, others.          Follow Up   No follow-ups on file.    Patient Instructions   Schedule EGD for further evaluation of symptoms.     Follow-up after testing complete.  Call for any new or worsening symptoms.

## 2025-06-23 NOTE — PATIENT INSTRUCTIONS
Schedule EGD for further evaluation of symptoms.     Follow-up after testing complete.  Call for any new or worsening symptoms.

## 2025-08-26 ENCOUNTER — ANESTHESIA EVENT (OUTPATIENT)
Dept: SURGERY | Facility: SURGERY CENTER | Age: 70
End: 2025-08-26
Payer: MEDICARE

## 2025-08-26 ENCOUNTER — HOSPITAL ENCOUNTER (OUTPATIENT)
Facility: SURGERY CENTER | Age: 70
Setting detail: HOSPITAL OUTPATIENT SURGERY
Discharge: HOME | End: 2025-08-26
Attending: INTERNAL MEDICINE | Admitting: INTERNAL MEDICINE
Payer: MEDICARE

## 2025-08-26 ENCOUNTER — ANESTHESIA (OUTPATIENT)
Dept: SURGERY | Facility: SURGERY CENTER | Age: 70
End: 2025-08-26
Payer: MEDICARE

## 2025-08-26 VITALS
WEIGHT: 118 LBS | BODY MASS INDEX: 20.91 KG/M2 | HEART RATE: 70 BPM | HEIGHT: 63 IN | OXYGEN SATURATION: 99 % | RESPIRATION RATE: 18 BRPM | DIASTOLIC BLOOD PRESSURE: 96 MMHG | TEMPERATURE: 98.6 F | SYSTOLIC BLOOD PRESSURE: 142 MMHG

## 2025-08-26 DIAGNOSIS — R13.10 DYSPHAGIA, UNSPECIFIED TYPE: ICD-10-CM

## 2025-08-26 PROCEDURE — 25010000002 PROPOFOL 10 MG/ML EMULSION: Performed by: ANESTHESIOLOGY

## 2025-08-26 PROCEDURE — 25010000002 LIDOCAINE 1 % SOLUTION: Performed by: ANESTHESIOLOGY

## 2025-08-26 PROCEDURE — 25810000003 LACTATED RINGERS PER 1000 ML

## 2025-08-26 PROCEDURE — C1726 CATH, BAL DIL, NON-VASCULAR: HCPCS | Performed by: INTERNAL MEDICINE

## 2025-08-26 RX ORDER — PROPOFOL 10 MG/ML
VIAL (ML) INTRAVENOUS AS NEEDED
Status: DISCONTINUED | OUTPATIENT
Start: 2025-08-26 | End: 2025-08-26 | Stop reason: SURG

## 2025-08-26 RX ORDER — LIDOCAINE HYDROCHLORIDE 10 MG/ML
INJECTION, SOLUTION INFILTRATION; PERINEURAL AS NEEDED
Status: DISCONTINUED | OUTPATIENT
Start: 2025-08-26 | End: 2025-08-26 | Stop reason: SURG

## 2025-08-26 RX ORDER — SODIUM CHLORIDE, SODIUM LACTATE, POTASSIUM CHLORIDE, CALCIUM CHLORIDE 600; 310; 30; 20 MG/100ML; MG/100ML; MG/100ML; MG/100ML
30 INJECTION, SOLUTION INTRAVENOUS CONTINUOUS PRN
Status: DISCONTINUED | OUTPATIENT
Start: 2025-08-26 | End: 2025-08-26 | Stop reason: HOSPADM

## 2025-08-26 RX ORDER — SODIUM CHLORIDE 0.9 % (FLUSH) 0.9 %
10 SYRINGE (ML) INJECTION AS NEEDED
Status: DISCONTINUED | OUTPATIENT
Start: 2025-08-26 | End: 2025-08-26 | Stop reason: HOSPADM

## 2025-08-26 RX ORDER — SODIUM CHLORIDE 0.9 % (FLUSH) 0.9 %
3 SYRINGE (ML) INJECTION EVERY 12 HOURS SCHEDULED
Status: DISCONTINUED | OUTPATIENT
Start: 2025-08-26 | End: 2025-08-26 | Stop reason: HOSPADM

## 2025-08-26 RX ADMIN — SODIUM CHLORIDE, POTASSIUM CHLORIDE, SODIUM LACTATE AND CALCIUM CHLORIDE 30 ML/HR: 600; 310; 30; 20 INJECTION, SOLUTION INTRAVENOUS at 11:15

## 2025-08-26 RX ADMIN — PROPOFOL 70 MG: 10 INJECTION, EMULSION INTRAVENOUS at 11:45

## 2025-08-26 RX ADMIN — PROPOFOL 20 MG: 10 INJECTION, EMULSION INTRAVENOUS at 11:50

## 2025-08-26 RX ADMIN — PROPOFOL 30 MG: 10 INJECTION, EMULSION INTRAVENOUS at 11:47

## 2025-08-26 RX ADMIN — LIDOCAINE HYDROCHLORIDE 30 MG: 10 INJECTION, SOLUTION INFILTRATION; PERINEURAL at 11:45

## (undated) DEVICE — SINGLE-USE BIOPSY FORCEPS: Brand: RADIAL JAW 4

## (undated) DEVICE — CANNULA,ADULT,SOFT-TOUCH,7'TUBE,UC: Brand: PENDING

## (undated) DEVICE — LN SMPL CO2 SHTRM SD STREAM W/M LUER

## (undated) DEVICE — TBG 02 CRUSH RESIST LF CLR 7FT

## (undated) DEVICE — MSK ENDO PORT O2 POM ELITE CURAPLEX A/

## (undated) DEVICE — ADAPT CLN SCPE ENDO PORPOISE BX/50 DISP

## (undated) DEVICE — TUBING, SUCTION, 1/4" X 10', STRAIGHT: Brand: MEDLINE

## (undated) DEVICE — THE TORRENT IRRIGATION SCOPE CONNECTOR IS USED WITH THE TORRENT IRRIGATION TUBING TO PROVIDE IRRIGATION FLUIDS SUCH AS STERILE WATER DURING GASTROINTESTINAL ENDOSCOPIC PROCEDURES WHEN USED IN CONJUNCTION WITH AN IRRIGATION PUMP (OR ELECTROSURGICAL UNIT).: Brand: TORRENT

## (undated) DEVICE — GOWN ISOL W/THUMB UNIV BLU BX/15

## (undated) DEVICE — ADAPT CLN BIOGUARD AIR/H2O DISP

## (undated) DEVICE — VIAL FORMLN CAP 10PCT 20ML

## (undated) DEVICE — GOWN,SIRUS,NONRNF,3XL,18/CS: Brand: MEDLINE

## (undated) DEVICE — Device: Brand: DEFENDO AIR/WATER/SUCTION AND BIOPSY VALVE

## (undated) DEVICE — BLCK/BITE BLOX W/DENTL/RIM W/STRAP 54F

## (undated) DEVICE — KT ORCA ORCAPOD DISP STRL

## (undated) DEVICE — CANN O2 ETCO2 FITS ALL CONN CO2 SMPL A/ 7IN DISP LF

## (undated) DEVICE — Device

## (undated) DEVICE — SENSR O2 OXIMAX FNGR A/ 18IN NONSTR

## (undated) DEVICE — DEV INFL ALLIANCE2 SYS

## (undated) DEVICE — ESOPHAGEAL BALLOON DILATATION CATHETER: Brand: CRE FIXED WIRE